# Patient Record
Sex: MALE | Race: BLACK OR AFRICAN AMERICAN | NOT HISPANIC OR LATINO | Employment: OTHER
[De-identification: names, ages, dates, MRNs, and addresses within clinical notes are randomized per-mention and may not be internally consistent; named-entity substitution may affect disease eponyms.]

---
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
## 2021-11-20 ENCOUNTER — HOSPITAL ENCOUNTER (EMERGENCY)
Facility: CLINIC | Age: 21
Discharge: HOME | End: 2021-11-20
Attending: EMERGENCY MEDICINE | Admitting: EMERGENCY MEDICINE
Payer: COMMERCIAL

## 2021-11-20 VITALS
OXYGEN SATURATION: 100 % | RESPIRATION RATE: 16 BRPM | WEIGHT: 116 LBS | SYSTOLIC BLOOD PRESSURE: 122 MMHG | TEMPERATURE: 98 F | HEART RATE: 70 BPM | BODY MASS INDEX: 17.13 KG/M2 | DIASTOLIC BLOOD PRESSURE: 83 MMHG

## 2021-11-20 DIAGNOSIS — R59.0 CERVICAL LYMPHADENOPATHY: ICD-10-CM

## 2021-11-20 DIAGNOSIS — J02.9 PHARYNGITIS WITH VIRAL SYNDROME: ICD-10-CM

## 2021-11-20 DIAGNOSIS — B34.9 PHARYNGITIS WITH VIRAL SYNDROME: ICD-10-CM

## 2021-11-20 LAB
DEPRECATED S PYO AG THROAT QL EIA: NEGATIVE
FLUAV RNA SPEC QL NAA+PROBE: NEGATIVE
FLUBV RNA RESP QL NAA+PROBE: NEGATIVE
GROUP A STREP BY PCR: NOT DETECTED
SARS-COV-2 RNA RESP QL NAA+PROBE: NEGATIVE

## 2021-11-20 PROCEDURE — 87651 STREP A DNA AMP PROBE: CPT | Performed by: EMERGENCY MEDICINE

## 2021-11-20 PROCEDURE — 99283 EMERGENCY DEPT VISIT LOW MDM: CPT | Performed by: EMERGENCY MEDICINE

## 2021-11-20 PROCEDURE — 87636 SARSCOV2 & INF A&B AMP PRB: CPT | Performed by: EMERGENCY MEDICINE

## 2021-11-20 PROCEDURE — C9803 HOPD COVID-19 SPEC COLLECT: HCPCS | Performed by: EMERGENCY MEDICINE

## 2021-11-20 RX ORDER — BENZOCAINE AND MENTHOL, UNSPECIFIED FORM 15; 2.3 MG/1; MG/1
1 LOZENGE ORAL
Qty: 16 LOZENGE | Refills: 0 | Status: SHIPPED | OUTPATIENT
Start: 2021-11-20 | End: 2022-06-28

## 2021-11-20 NOTE — ED PROVIDER NOTES
"    [facility] EMERGENCY DEPARTMENT ([facility])       11/20/21  History     Chief Complaint   Patient presents with     Neck Pain     Reports lumps in his neck on the right side      Otalgia     Right ear     HPI  Sea Ash is a 21 year old male with a past medical history significant for R wrist fracture who presents to the Emergency Department for evaluation of \"lumps\" on the right side of his neck. A/w R ear pain and sore throat.  He reports that he has had some difficulty eating 2/2 this, however his sore throat has improved today compared to yesterday..  He denies any fever or cough.  No known sick contacts.  He is not vaccinated against COVID-19 infection.  He is concerned that the lumps on his neck are cancer.    Past Medical History  History reviewed. No pertinent past medical history.  History reviewed. No pertinent surgical history.  NO ACTIVE MEDICATIONS  traMADol (ULTRAM) 50 MG tablet      No Known Allergies  Family History  History reviewed. No pertinent family history.  Social History   Social History     Tobacco Use     Smoking status: Never Smoker     Smokeless tobacco: Never Used   Substance Use Topics     Alcohol use: No     Drug use: No      Past medical history, past surgical history, medications, allergies, family history, and social history were reviewed with the patient. No additional pertinent items.     I have reviewed the Medications, Allergies, Past Medical and Surgical History, and Social History in the Epic system.    Review of Systems   General: No fevers or chills  Skin: No rash or diaphoresis  Eyes: No eye redness or discharge  Ears/Nose/Throat: See HPI  Respiratory: No cough or SOB  Cardiovascular: No chest pain or palpitations  Gastrointestinal: No nausea, vomiting, or diarrhea  Genitourinary: No urinary frequency, hematuria, or dysuria  Musculoskeletal: No arthralgias or myalgias  Neurologic: No numbness or weakness  Psychiatric: No depression or " SI  Hematologic/Lymphatic/Immunologic: No leg swelling, no easy bruising/bleeding  Endocrine: No polyuria/polydypsia    A complete review of systems was performed with pertinent positives and negatives noted in the HPI, and all other systems negative.    Physical Exam   BP: 122/83  Pulse: 70  Temp: 98  F (36.7  C)  Resp: 16  Weight: 52.6 kg (116 lb)  SpO2: 100 %      Physical Exam  General: Well nourished, well developed, NAD  HEENT: EOMI, anicteric. NCAT, MMM, mild posterior oropharyngeal erythema without tonsillar swelling or exudate.  Left tympanic membrane is normal, right external ear canal partially obscured with cerumen, however, visualized portion of right tympanic membrane is normal  Neck: no jugular venous distension, supple, nl ROM, shotty anterior cervical lymphadenopathy  Cardiac: Regular rate and rhythm. No murmurs, rubs, or gallops. Normal S1, S2.  Intact peripheral pulses  Pulm: CTAB, no stridor, wheezes, rales, rhonchi   Skin: Warm and dry to the touch.  No rash  Extremities: No LE edema, no cyanosis, w/w/p  Neuro: A&Ox3, no gross focal deficits        ED Course        Procedures            The medical record was reviewed and interpreted.  Current labs reviewed and interpreted.         Results for orders placed or performed during the hospital encounter of 11/20/21 (from the past 24 hour(s))   Streptococcus A Rapid Scr w Reflx to PCR    Specimen: Throat; Swab   Result Value Ref Range    Group A Strep antigen Negative Negative   Symptomatic Influenza A/B & SARS-CoV2 (COVID-19) Virus PCR Multiplex Nasopharyngeal    Specimen: Nasopharyngeal; Swab   Result Value Ref Range    Influenza A target Negative Negative    Influenza B target Negative Negative    SARS CoV2 PCR Negative Negative    Narrative    Testing was performed using the marilyn SARS-CoV-2 & Influenza A/B Assay on the marilyn [employer] System. This test should be ordered for the detection of SARS-CoV-2 and influenza viruses in individuals who meet  clinical and/or epidemiological criteria. Test performance is unknown in asymptomatic patients. This test is for in vitro diagnostic use under the FDA EUA for laboratories certified under CLIA to perform moderate and/or high complexity testing. This test has not been FDA cleared or approved. A negative result does not rule out the presence of PCR inhibitors in the specimen or target RNA in concentration below the limit of detection for the assay. If only one viral target is positive but coinfection with multiple targets is suspected, the sample should be re-tested with another FDA cleared, approved or authorized test, if coinfection would change clinical management. [facility] are certified under the Clinical Laboratory Improvement Amendments of 1988 (CLIA-88) as  qualified to perform moderate and/or high complexity laboratory testing.   Group A Streptococcus PCR Throat Swab    Specimen: Throat; Swab   Result Value Ref Range    Group A strep by PCR Not Detected Not Detected    Narrative    The Xpert Xpress Strep A test, performed on the [employer], is a rapid, qualitative in vitro diagnostic test for the detection of Streptococcus pyogenes (Group A ß-hemolytic Streptococcus, Strep A) in throat swab specimens from patients with signs and symptoms of pharyngitis. The Xpert Xpress Strep A test can be used as an aid in the diagnosis of Group A Streptococcal pharyngitis. The assay is not intended to monitor treatment for Group A Streptococcus infections. The Xpert Xpress Strep A test utilizes an automated real-time polymerase chain reaction (PCR) to detect Streptococcus pyogenes DNA.     Medications - No data to display          Assessments & Plan (with Medical Decision Making)   The pt is a 21 yom who presents with sore throat, right ear pain with associated cervical LAD.  Exam consistent with pharyngitis with associated cervical lymphadenopathy.  No significantly enlarged solitary  lymph nodes or other findings to suggest malignant process.  Patient is requesting a CT at this time, advised patient that given that the most likely diagnosis was viral syndrome with associated lymphadenopathy, the risks of CT (i.e. radiation and contrast administration) would exceed the benefits at this time.  However, patient is advised to follow-up with his PCP.  Further work-up of lymphadenopathy may be indicated if it does not resolve within several weeks after his upper respiratory infection symptoms have resolved.    Strep, Covid, influenza, and RSV testing were sent and were negative    I have reviewed the nursing notes.    I have reviewed the findings, diagnosis, plan and need for follow up with the patient.    Patient to be discharged home. Advised to follow up with PCP To return to ER immediately with any new/worsening symptoms. Plan of care discussed with patient who expresses understanding and agrees with plan of care.  Patient is advised to use Tylenol/ibuprofen for pain control at home, additionally he is given a prescription for Cepacol      Discharge Medication List as of 11/20/2021  6:21 PM      START taking these medications    Details   Benzocaine-Menthol (CEPACOL) 15-2.3 MG LOZG Take 1 lozenge by mouth every 2 hours as needed (sore throat), Disp-16 lozenge, R-0, Local Print             Final diagnoses:   Cervical lymphadenopathy   Pharyngitis with viral syndrome       I, Deja Andrade am serving as a trained medical scribe to document services personally performed by Vince Hartmann MD, based on the provider's statements to me.      I, Rahel Quinonez MD, was physically present and have reviewed and verified the accuracy of this note documented by Deja Andrade.     Rahel Quinonez MD  11/20/2021   [facility] EMERGENCY DEPARTMENT     Rahel Quinonez MD  11/21/21 6200

## 2021-11-21 ENCOUNTER — NURSE TRIAGE (OUTPATIENT)
Dept: NURSING | Facility: CLINIC | Age: 21
End: 2021-11-21
Payer: COMMERCIAL

## 2021-11-21 NOTE — TELEPHONE ENCOUNTER

## 2021-11-21 NOTE — RESULT ENCOUNTER NOTE
Negative for Influenza A, Influenza B, and Covid19.  Patient will receive the Covid19 result via [employer] and a letter will be sent via "[employer] (if active) or via the mail

## 2021-11-28 ENCOUNTER — HOSPITAL ENCOUNTER (EMERGENCY)
Facility: CLINIC | Age: 21
Discharge: HOME | End: 2021-11-28
Attending: EMERGENCY MEDICINE | Admitting: EMERGENCY MEDICINE
Payer: COMMERCIAL

## 2021-11-28 VITALS
SYSTOLIC BLOOD PRESSURE: 128 MMHG | WEIGHT: 109 LBS | BODY MASS INDEX: 16.1 KG/M2 | TEMPERATURE: 98.3 F | OXYGEN SATURATION: 99 % | RESPIRATION RATE: 16 BRPM | DIASTOLIC BLOOD PRESSURE: 83 MMHG | HEART RATE: 77 BPM

## 2021-11-28 DIAGNOSIS — K60.2 ANAL FISSURE: ICD-10-CM

## 2021-11-28 DIAGNOSIS — K62.89 RECTAL PAIN: ICD-10-CM

## 2021-11-28 PROCEDURE — 99282 EMERGENCY DEPT VISIT SF MDM: CPT | Performed by: EMERGENCY MEDICINE

## 2021-11-28 PROCEDURE — 99283 EMERGENCY DEPT VISIT LOW MDM: CPT | Performed by: EMERGENCY MEDICINE

## 2021-11-28 RX ORDER — HYDROXYZINE HYDROCHLORIDE 25 MG/1
TABLET, FILM COATED ORAL
COMMUNITY
Start: 2021-11-17 | End: 2022-12-06

## 2021-11-28 ASSESSMENT — ENCOUNTER SYMPTOMS
BLOOD IN STOOL: 0
BRUISES/BLEEDS EASILY: 0
HEADACHES: 0
COLOR CHANGE: 0
CONFUSION: 0
RECTAL PAIN: 1
SHORTNESS OF BREATH: 0
FEVER: 0
ABDOMINAL PAIN: 1
ARTHRALGIAS: 0
NECK STIFFNESS: 0
DIFFICULTY URINATING: 0
COUGH: 0
DYSURIA: 1
EYE REDNESS: 0
DIARRHEA: 1

## 2021-11-28 NOTE — ED PROVIDER NOTES
ED Provider Note  [facility]      History     Chief Complaint   Patient presents with     Dysuria     Painful urination x 1 year     Diarrhea     Watery, yellow diarrhea x 1 month. Pt also endorses rectal pain.     The history is provided by the patient.     Sea Ash is a 21 year old otherwise healthy male who presents to the ED for a chief complaint of rectal pain.  Patient reports starting 2 days ago when he passes stool he has been experiencing some rectal and anus pain.  He has been passing watery, yellowish diarrhea for the past month.  He notes sometimes he has to strain when passing stool.  Patient denies blood in stool.  He endorses mild lower abdominal pain that is described as an intermittent cramping sensation. Patient reports dysuria for the last year.  He states that it is more painful at the end of urinating and has trouble emptying his bladder. Patent has been seen by his primary care provider and has been referred to urology for further evaluation.  Patient reports that he had a cold last week but those symptoms have resolved.  Denies fever.  Patient denies recent sexual activity.  No foreign objects in rectal area.  No penile drainage or discharge.  Patient has not taken any medication for pain.  He reports that he does chew tobacco sometimes, but denies any drug use.     Patient had recent labs on 11/17/2021 that showed microscopic blood in his urine but otherwise normal results.  Patient has been referred to urology.    Past Medical History  History reviewed. No pertinent past medical history.  History reviewed. No pertinent surgical history.  Benzocaine-Menthol (CEPACOL) 15-2.3 MG LOZG  hydrOXYzine (ATARAX) 25 MG tablet  NO ACTIVE MEDICATIONS  traMADol (ULTRAM) 50 MG tablet      No Known Allergies  Family History  History reviewed. No pertinent family history.  Social History   Social History     Tobacco Use     Smoking status: Never Smoker     Smokeless tobacco:  Never Used   Substance Use Topics     Alcohol use: No     Drug use: No      Past medical history, past surgical history, medications, allergies, family history, and social history were reviewed with the patient. No additional pertinent items.       Review of Systems   Constitutional: Negative for fever.   HENT: Negative for congestion.    Eyes: Negative for redness.   Respiratory: Negative for cough and shortness of breath.    Cardiovascular: Negative for chest pain.   Gastrointestinal: Positive for abdominal pain, diarrhea and rectal pain. Negative for blood in stool.   Endocrine: Negative for polyuria.   Genitourinary: Positive for dysuria. Negative for difficulty urinating and penile discharge.   Musculoskeletal: Negative for arthralgias and neck stiffness.   Skin: Negative for color change.   Allergic/Immunologic: Negative for immunocompromised state.   Neurological: Negative for headaches.   Hematological: Does not bruise/bleed easily.   Psychiatric/Behavioral: Negative for confusion.     A complete review of systems was performed with pertinent positives and negatives noted in the HPI, and all other systems negative.    Physical Exam   BP: 128/83  Pulse: 77  Temp: 98.3  F (36.8  C)  Resp: 16  Weight: 49.4 kg (109 lb)  SpO2: 99 %  Physical Exam  General: Afebrile, no acute distress   HEENT: Normocephalic, atraumatic, conjunctivae normal. MMM  Neck: non-tender, supple  Cardio: regular rate. regular rhythm   Resp: Normal work of breathing, no respiratory distress, lungs clear bilaterally, no wheezing, rhonchi, rales  Chest/Back: no visual signs of trauma, no CVA tenderness   Abdomen: soft, non distension, no tenderness, no peritoneal signs   Rectal exam: small anal fissure with no active bleeding, no hemorrhoids, internal exam with no gross blood, hemoccult negative  Neuro: alert and fully oriented. CN II-XII grossly intact. Grossly normal strength and sensation in all extremities.   MSK: no deformities. Normal  range of motion  Integumentary/Skin: no rash visualized, normal color  Psych: normal affect, normal behavior    ED Course     1:56 AM  The patient was seen and examined by Hanna Rea MD in Room ED05.  Procedures  No results found for any visits on 11/28/21.  Medications - No data to display     Assessments & Plan (with Medical Decision Making)     Sea Ash is a 21 year old otherwise healthy male who presents to the ED for a chief complaint of rectal pain.  Upon arrival patient is well-appearing, afebrile, no distress.  Patient here with rectal pain x 2 days that is worse with bowel movement and touching his rectal area. No trauma or injury. Patient reports loose watery stools for the past 1 month.  On examination patient with small anal fissure, no evidence of hemorrhoids, no rectal bleeding, Hemoccult negative, no evidence of trauma.  I suspect patient symptoms are likely related to anal fissure related to multiple watery stools.  At this time recommends continued supportive care, encourage high-fiber diet, sitz bath, pain control with Tylenol and ibuprofen.  And close outpatient follow-up with his primary care provider.  Patient is agreeable to this plan.  Patient also has dysuria that has been ongoing for the past 1 year, has recently been seen by his primary care provider last week, urinalysis with microscopic blood, no evidence of acute infection, and is pending follow-up with urology, referral has been placed.  We will continue outpatient management and urology referral is overall patient is nontoxic-appearing with no acute or worsening symptoms.  Return precautions discussed if high fever, severe pain, persistent vomiting, or any  worsening symptoms.  Patient understands and agrees with plan.    I have reviewed the nursing notes. I have reviewed the findings, diagnosis, plan and need for follow up with the patient.    New Prescriptions    No medications on file       Final diagnoses:   Rectal pain    Anal fissure       --  I, Amirah Kirby, am serving as a trained medical scribe to document services personally performed by Hanna Rea MD, based on the provider's statements to me.     I, Hanna Rea MD, was physically present and have reviewed and verified the accuracy of this note documented by Amirah Kirby.    Hanna Rea MD  [facility] EMERGENCY DEPARTMENT  11/28/2021     Hanna Rea MD  11/28/21 0234

## 2021-11-28 NOTE — DISCHARGE INSTRUCTIONS
Thank you for your patience today.  Please follow-up with your regular doctor in the next 2-3 days for further evaluation and follow-up care.  Please call to schedule an appointment. Please follow up with urology as previously directed.  Please continue your own medications.  Please eat a diet high in fiber, take sitz baths daily. You may take tylenol or ibuprofen as needed for pain.  Please return to the ER if you develop high fever, severe pain, persistent vomiting, or any worsening of your current symptoms.  It was a pleasure taking care of you today.  We hope you feel better soon.

## 2022-03-26 ENCOUNTER — APPOINTMENT (OUTPATIENT)
Dept: GENERAL RADIOLOGY | Facility: CLINIC | Age: 22
End: 2022-03-26
Attending: EMERGENCY MEDICINE
Payer: COMMERCIAL

## 2022-03-26 ENCOUNTER — HOSPITAL ENCOUNTER (EMERGENCY)
Facility: CLINIC | Age: 22
Discharge: HOME | End: 2022-03-26
Attending: EMERGENCY MEDICINE | Admitting: EMERGENCY MEDICINE
Payer: COMMERCIAL

## 2022-03-26 VITALS
SYSTOLIC BLOOD PRESSURE: 116 MMHG | HEART RATE: 98 BPM | RESPIRATION RATE: 16 BRPM | BODY MASS INDEX: 16.7 KG/M2 | TEMPERATURE: 97.9 F | OXYGEN SATURATION: 99 % | WEIGHT: 113.1 LBS | DIASTOLIC BLOOD PRESSURE: 71 MMHG

## 2022-03-26 DIAGNOSIS — R10.13 ABDOMINAL PAIN, EPIGASTRIC: ICD-10-CM

## 2022-03-26 LAB
ALBUMIN SERPL-MCNC: 3.5 G/DL (ref 3.4–5)
ALP SERPL-CCNC: 79 U/L (ref 40–150)
ALT SERPL W P-5'-P-CCNC: 17 U/L (ref 0–70)
ANION GAP SERPL CALCULATED.3IONS-SCNC: 8 MMOL/L (ref 3–14)
AST SERPL W P-5'-P-CCNC: 11 U/L (ref 0–45)
BASOPHILS # BLD AUTO: 0 10E3/UL (ref 0–0.2)
BASOPHILS NFR BLD AUTO: 1 %
BILIRUB SERPL-MCNC: 0.6 MG/DL (ref 0.2–1.3)
BUN SERPL-MCNC: 12 MG/DL (ref 7–30)
CALCIUM SERPL-MCNC: 9.3 MG/DL (ref 8.5–10.1)
CHLORIDE BLD-SCNC: 105 MMOL/L (ref 94–109)
CO2 SERPL-SCNC: 26 MMOL/L (ref 20–32)
CREAT SERPL-MCNC: 0.91 MG/DL (ref 0.66–1.25)
EOSINOPHIL # BLD AUTO: 0.1 10E3/UL (ref 0–0.7)
EOSINOPHIL NFR BLD AUTO: 2 %
ERYTHROCYTE [DISTWIDTH] IN BLOOD BY AUTOMATED COUNT: 12.7 % (ref 10–15)
GFR SERPL CREATININE-BSD FRML MDRD: >90 ML/MIN/1.73M2
GLUCOSE BLD-MCNC: 115 MG/DL (ref 70–99)
HCT VFR BLD AUTO: 44.6 % (ref 40–53)
HGB BLD-MCNC: 15 G/DL (ref 13.3–17.7)
IMM GRANULOCYTES # BLD: 0 10E3/UL
IMM GRANULOCYTES NFR BLD: 0 %
LIPASE SERPL-CCNC: 96 U/L (ref 73–393)
LYMPHOCYTES # BLD AUTO: 2.3 10E3/UL (ref 0.8–5.3)
LYMPHOCYTES NFR BLD AUTO: 28 %
MCH RBC QN AUTO: 28.1 PG (ref 26.5–33)
MCHC RBC AUTO-ENTMCNC: 33.6 G/DL (ref 31.5–36.5)
MCV RBC AUTO: 84 FL (ref 78–100)
MONOCYTES # BLD AUTO: 0.4 10E3/UL (ref 0–1.3)
MONOCYTES NFR BLD AUTO: 6 %
NEUTROPHILS # BLD AUTO: 5.2 10E3/UL (ref 1.6–8.3)
NEUTROPHILS NFR BLD AUTO: 63 %
NRBC # BLD AUTO: 0 10E3/UL
NRBC BLD AUTO-RTO: 0 /100
PLATELET # BLD AUTO: 312 10E3/UL (ref 150–450)
POTASSIUM BLD-SCNC: 3.7 MMOL/L (ref 3.4–5.3)
PROT SERPL-MCNC: 7.2 G/DL (ref 6.8–8.8)
RBC # BLD AUTO: 5.33 10E6/UL (ref 4.4–5.9)
SODIUM SERPL-SCNC: 139 MMOL/L (ref 133–144)
TROPONIN I SERPL HS-MCNC: 3 NG/L
WBC # BLD AUTO: 8 10E3/UL (ref 4–11)

## 2022-03-26 PROCEDURE — 74019 RADEX ABDOMEN 2 VIEWS: CPT

## 2022-03-26 PROCEDURE — 84484 ASSAY OF TROPONIN QUANT: CPT | Performed by: EMERGENCY MEDICINE

## 2022-03-26 PROCEDURE — 250N000013 HC RX MED GY IP 250 OP 250 PS 637: Performed by: EMERGENCY MEDICINE

## 2022-03-26 PROCEDURE — 36415 COLL VENOUS BLD VENIPUNCTURE: CPT | Performed by: EMERGENCY MEDICINE

## 2022-03-26 PROCEDURE — 99285 EMERGENCY DEPT VISIT HI MDM: CPT | Mod: 25 | Performed by: EMERGENCY MEDICINE

## 2022-03-26 PROCEDURE — 250N000009 HC RX 250: Performed by: EMERGENCY MEDICINE

## 2022-03-26 PROCEDURE — 85025 COMPLETE CBC W/AUTO DIFF WBC: CPT | Performed by: EMERGENCY MEDICINE

## 2022-03-26 PROCEDURE — 80053 COMPREHEN METABOLIC PANEL: CPT | Performed by: EMERGENCY MEDICINE

## 2022-03-26 PROCEDURE — 83690 ASSAY OF LIPASE: CPT | Performed by: EMERGENCY MEDICINE

## 2022-03-26 PROCEDURE — 82040 ASSAY OF SERUM ALBUMIN: CPT | Performed by: EMERGENCY MEDICINE

## 2022-03-26 PROCEDURE — 93010 ELECTROCARDIOGRAM REPORT: CPT | Performed by: EMERGENCY MEDICINE

## 2022-03-26 PROCEDURE — 93005 ELECTROCARDIOGRAM TRACING: CPT | Performed by: EMERGENCY MEDICINE

## 2022-03-26 RX ORDER — FAMOTIDINE 10 MG
10 TABLET ORAL 2 TIMES DAILY
Qty: 28 TABLET | Refills: 0 | Status: SHIPPED | OUTPATIENT
Start: 2022-03-26 | End: 2022-04-09

## 2022-03-26 RX ADMIN — LIDOCAINE HYDROCHLORIDE 30 ML: 20 SOLUTION ORAL; TOPICAL at 20:38

## 2022-03-26 ASSESSMENT — ENCOUNTER SYMPTOMS
DIARRHEA: 0
SHORTNESS OF BREATH: 0
TREMORS: 1
HEMATURIA: 0
CONSTIPATION: 0
DYSURIA: 0
CHILLS: 0
FEVER: 0
BLOOD IN STOOL: 0
ABDOMINAL PAIN: 1
VOMITING: 0

## 2022-03-27 LAB
ATRIAL RATE - MUSE: 72 BPM
DIASTOLIC BLOOD PRESSURE - MUSE: NORMAL MMHG
INTERPRETATION ECG - MUSE: NORMAL
P AXIS - MUSE: 79 DEGREES
PR INTERVAL - MUSE: 136 MS
QRS DURATION - MUSE: 86 MS
QT - MUSE: 352 MS
QTC - MUSE: 385 MS
R AXIS - MUSE: 85 DEGREES
SYSTOLIC BLOOD PRESSURE - MUSE: NORMAL MMHG
T AXIS - MUSE: 72 DEGREES
VENTRICULAR RATE- MUSE: 72 BPM

## 2022-03-27 NOTE — ED PROVIDER NOTES
ED Provider Note  [facility]      History   No chief complaint on file.    HPI  Sea Ash is an otherwise-healthy 21 year old male who presents to the ED today complaining of abdominal and left arm pain.  Patient is complaining of constant epigastric abdominal pain which started at around 1 PM this afternoon.  He reports his abdominal pain is worse with deep inspiration.  He states that his pain sometimes radiates to his neck and back.  He denies experiencing similar pain in the past. He also notes that his left arm has been twitching at night for the past 3 days, adding that this arm started hurting today.  Patient denies vomiting, diarrhea, constipation, bloody stool, prior abdominal surgeries, fever, chills, dysuria, hematuria, alcohol use, tobacco use or any shortness of breath.    Past Medical History  History reviewed. No pertinent past medical history.  History reviewed. No pertinent surgical history.  Benzocaine-Menthol (CEPACOL) 15-2.3 MG LOZG  hydrOXYzine (ATARAX) 25 MG tablet  NO ACTIVE MEDICATIONS  traMADol (ULTRAM) 50 MG tablet      No Known Allergies  Family History  No family history on file.  Social History   Social History     Tobacco Use     Smoking status: Never Smoker     Smokeless tobacco: Never Used   Substance Use Topics     Alcohol use: No     Drug use: No      Past medical history, past surgical history, medications, allergies, family history, and social history were reviewed with the patient. No additional pertinent items.       Review of Systems   Constitutional: Negative for chills and fever.   Respiratory: Negative for shortness of breath.    Gastrointestinal: Positive for abdominal pain (epigastric, constant). Negative for blood in stool, constipation, diarrhea and vomiting.   Genitourinary: Negative for dysuria and hematuria.   Musculoskeletal:        (Positive for left arm pain)   Neurological: Positive for tremors (Left arm).   All other systems  reviewed and are negative.    A complete review of systems was performed with pertinent positives and negatives noted in the HPI, and all other systems negative.    Physical Exam      Physical Exam  Vitals and nursing note reviewed.   Constitutional:       General: He is not in acute distress.     Appearance: He is not diaphoretic.   HENT:      Head: Normocephalic and atraumatic.      Right Ear: Tympanic membrane normal.      Left Ear: Tympanic membrane normal.   Cardiovascular:      Rate and Rhythm: Normal rate and regular rhythm.   Pulmonary:      Effort: Pulmonary effort is normal. No respiratory distress.      Breath sounds: Normal breath sounds. No wheezing or rales.   Abdominal:      General: There is no distension.      Palpations: Abdomen is soft.      Tenderness: There is no abdominal tenderness. There is no right CVA tenderness, left CVA tenderness or guarding.   Musculoskeletal:         General: Normal range of motion.      Cervical back: Normal range of motion and neck supple.   Skin:     General: Skin is warm and dry.      Coloration: Skin is not cyanotic, jaundiced or pale.      Findings: No rash.   Neurological:      General: No focal deficit present.      Mental Status: He is alert and oriented to person, place, and time.      Sensory: No sensory deficit.   Psychiatric:         Mood and Affect: Mood normal.         Behavior: Behavior normal.         ED Course     8:21 PM  The patient was seen and examined by Aaron Sinclair MD in Room ED06.     Procedures       The medical record was reviewed and interpreted.  Current labs reviewed and interpreted.  Current images reviewed and interpreted: Abd xray normal.  EKG reviewed and interpreted: NSR.            EKG Interpretation:      Interpreted by Aaron Sinclair MD  Time reviewed:810p   Symptoms at time of EKG: cp   Rhythm: normal sinus   Rate: normal  Axis: NORMAL  Ectopy: none  Conduction: normal  ST Segments/ T Waves: No ST-T wave  changes  Q Waves: none  Comparison to prior: Unchanged    Clinical Impression: normal EKG       No results found for any visits on 03/26/22.  Medications - No data to display     Assessments & Plan (with Medical Decision Making)   Sea presents with epigastric abdominal pain over the past 24 hours.  Differential includes GB disease, gastritis, enteritis, perforated viscus.  An abdominal xray showed a normal bowel gas pattern with no evidence of obstruction or free air. His labs, CBC, CMP and lipase were also normal. His EKG showed no abnormalities and his troponin was in the reference range.  He felt improved after a GI cocktail.  I suspect a benign GI etiology.  He was discharged with instructions to eat a bland diet and take Pepcid for the next 2 weeks and follow up with his primary care provider or return to the ER if worsening symptoms.     I have reviewed the nursing notes. I have reviewed the findings, diagnosis, plan and need for follow up with the patient.    New Prescriptions    No medications on file       Final diagnoses:   None   ISteven, am serving as a trained medical scribe to document services personally performed by Aaron Sinclair MD, based on the provider's statements to me.     I, Aaron Sinclair MD, was physically present and have reviewed and verified the accuracy of this note documented by Steven Paulino.      --  Aaron Sinclair MD  [facility] EMERGENCY DEPARTMENT  3/26/2022     Aaron Sinclair MD  03/27/22 1255       Aaron Sinclair MD  03/30/22 [id number]

## 2022-03-27 NOTE — DISCHARGE INSTRUCTIONS
Your testing today was normal.  Please take Pepcid for symptoms of heartburn.  Please avoid foods that may make heartburn worse, please see the attached document.  Return if you have any worsening symptoms.

## 2022-06-27 ENCOUNTER — HOSPITAL ENCOUNTER (EMERGENCY)
Facility: CLINIC | Age: 22
Discharge: HOME | End: 2022-06-27
Attending: EMERGENCY MEDICINE | Admitting: EMERGENCY MEDICINE
Payer: COMMERCIAL

## 2022-06-27 VITALS
HEART RATE: 62 BPM | DIASTOLIC BLOOD PRESSURE: 69 MMHG | BODY MASS INDEX: 13.45 KG/M2 | SYSTOLIC BLOOD PRESSURE: 110 MMHG | OXYGEN SATURATION: 100 % | HEIGHT: 78 IN | TEMPERATURE: 98.2 F | RESPIRATION RATE: 16 BRPM | WEIGHT: 116.2 LBS

## 2022-06-27 DIAGNOSIS — N64.52 NIPPLE DISCHARGE: ICD-10-CM

## 2022-06-27 LAB
ALBUMIN SERPL-MCNC: 3.9 G/DL (ref 3.4–5)
ALP SERPL-CCNC: 74 U/L (ref 40–150)
ALT SERPL W P-5'-P-CCNC: 17 U/L (ref 0–70)
ANION GAP SERPL CALCULATED.3IONS-SCNC: 7 MMOL/L (ref 3–14)
AST SERPL W P-5'-P-CCNC: 12 U/L (ref 0–45)
BASOPHILS # BLD AUTO: 0 10E3/UL (ref 0–0.2)
BASOPHILS NFR BLD AUTO: 1 %
BILIRUB SERPL-MCNC: 0.8 MG/DL (ref 0.2–1.3)
BUN SERPL-MCNC: 11 MG/DL (ref 7–30)
CALCIUM SERPL-MCNC: 8.7 MG/DL (ref 8.5–10.1)
CHLORIDE BLD-SCNC: 109 MMOL/L (ref 94–109)
CO2 SERPL-SCNC: 26 MMOL/L (ref 20–32)
CREAT SERPL-MCNC: 0.93 MG/DL (ref 0.66–1.25)
EOSINOPHIL # BLD AUTO: 0 10E3/UL (ref 0–0.7)
EOSINOPHIL NFR BLD AUTO: 0 %
ERYTHROCYTE [DISTWIDTH] IN BLOOD BY AUTOMATED COUNT: 13.4 % (ref 10–15)
GFR SERPL CREATININE-BSD FRML MDRD: >90 ML/MIN/1.73M2
GLUCOSE BLD-MCNC: 91 MG/DL (ref 70–99)
HCT VFR BLD AUTO: 41 % (ref 40–53)
HGB BLD-MCNC: 13.7 G/DL (ref 13.3–17.7)
IMM GRANULOCYTES # BLD: 0 10E3/UL
IMM GRANULOCYTES NFR BLD: 0 %
LYMPHOCYTES # BLD AUTO: 2.3 10E3/UL (ref 0.8–5.3)
LYMPHOCYTES NFR BLD AUTO: 46 %
MCH RBC QN AUTO: 28.7 PG (ref 26.5–33)
MCHC RBC AUTO-ENTMCNC: 33.4 G/DL (ref 31.5–36.5)
MCV RBC AUTO: 86 FL (ref 78–100)
MONOCYTES # BLD AUTO: 0.3 10E3/UL (ref 0–1.3)
MONOCYTES NFR BLD AUTO: 7 %
NEUTROPHILS # BLD AUTO: 2.3 10E3/UL (ref 1.6–8.3)
NEUTROPHILS NFR BLD AUTO: 46 %
NRBC # BLD AUTO: 0 10E3/UL
NRBC BLD AUTO-RTO: 0 /100
PLATELET # BLD AUTO: 267 10E3/UL (ref 150–450)
POTASSIUM BLD-SCNC: 4 MMOL/L (ref 3.4–5.3)
PROLACTIN SERPL 3RD IS-MCNC: 7 NG/ML (ref 4–15)
PROT SERPL-MCNC: 6.9 G/DL (ref 6.8–8.8)
RBC # BLD AUTO: 4.77 10E6/UL (ref 4.4–5.9)
SODIUM SERPL-SCNC: 142 MMOL/L (ref 133–144)
TSH SERPL DL<=0.005 MIU/L-ACNC: 2.27 MU/L (ref 0.4–4)
WBC # BLD AUTO: 5 10E3/UL (ref 4–11)

## 2022-06-27 PROCEDURE — 84146 ASSAY OF PROLACTIN: CPT | Performed by: EMERGENCY MEDICINE

## 2022-06-27 PROCEDURE — 84443 ASSAY THYROID STIM HORMONE: CPT | Performed by: EMERGENCY MEDICINE

## 2022-06-27 PROCEDURE — 85025 COMPLETE CBC W/AUTO DIFF WBC: CPT | Performed by: EMERGENCY MEDICINE

## 2022-06-27 PROCEDURE — 99282 EMERGENCY DEPT VISIT SF MDM: CPT | Performed by: EMERGENCY MEDICINE

## 2022-06-27 PROCEDURE — 80053 COMPREHEN METABOLIC PANEL: CPT | Performed by: EMERGENCY MEDICINE

## 2022-06-27 PROCEDURE — 36415 COLL VENOUS BLD VENIPUNCTURE: CPT | Performed by: EMERGENCY MEDICINE

## 2022-06-27 NOTE — ED TRIAGE NOTES
Pt having discharge from from bilateral nipples.  Pt stated R nipple had bloody discharge when squeezed.  Pt stated bilateral tenderness started last week.  Pt denies anything extra strenuous with chest muscles or any trauma/injury to chest.     Triage Assessment     Row Name 06/27/22 0536       Triage Assessment (Adult)    Airway WDL WDL       Respiratory WDL    Respiratory WDL WDL       Skin Circulation/Temperature WDL    Skin Circulation/Temperature WDL WDL       Cardiac WDL    Cardiac WDL WDL       Peripheral/Neurovascular WDL    Peripheral Neurovascular WDL WDL       Cognitive/Neuro/Behavioral WDL    Cognitive/Neuro/Behavioral WDL WDL

## 2022-06-27 NOTE — ED PROVIDER NOTES
ED Provider Note  [facility]      History     Chief Complaint   Patient presents with     Breast Problem     Pt having discharge from from bilateral nipples.  Pt stated R nipple had bloody discharge when squeezed.  Pt stated bilateral tenderness started last week.  Pt denies anything extra strenuous with chest muscles or any trauma/injury to chest.     HPI  Sea Ash is a 21 year old male who has had a week of nipple sensitivity now with some discharge, clear out of the left and some bloody on the right.  Denies masses, fevers, chills.  He is not have other chest pain or shortness of breath.  No history of heart issues.  He does not take any drugs or supplements.  No change in vision, headaches, nausea, vomiting, vertigo.  Denies numbness, tingling or weakness.  He is never had a thing like this in the past.  No increase in running or activity.  Denies any skin changes.  Not sure about family history of cancer.  He has had the sensitivity for the past week.    Patient declined formal     Past Medical History  History reviewed. No pertinent past medical history.  History reviewed. No pertinent surgical history.  Benzocaine-Menthol (CEPACOL) 15-2.3 MG LOZG  hydrOXYzine (ATARAX) 25 MG tablet  traMADol (ULTRAM) 50 MG tablet  NO ACTIVE MEDICATIONS      No Known Allergies  Family History  History reviewed. No pertinent family history.  Social History   Social History     Tobacco Use     Smoking status: Never Smoker     Smokeless tobacco: Never Used   Substance Use Topics     Alcohol use: No     Drug use: No      Past medical history, past surgical history, medications, allergies, family history, and social history were reviewed with the patient. No additional pertinent items.       Review of Systems  A complete review of systems was performed with pertinent positives and negatives noted in the HPI, and all other systems negative.    Physical Exam   BP: 117/75  Pulse: 59  Temp:  "98.1  F (36.7  C)  Resp: 14  Height: 203.2 cm (6' 8\")  Weight: 52.7 kg (116 lb 3.2 oz)  SpO2: 100 %  Physical Exam  Physical Exam   Constitutional: oriented to person, place, and time. appears well-developed and well-nourished.   HENT:   Head: Normocephalic and atraumatic.   Neck: Normal range of motion.   Pulmonary/Chest: Effort normal. No respiratory distress. no masses over the breast.  No skin changes over the chest wall.  No discharge present on the bilateral nipples.  Cardiac: No murmurs, rubs, gallops. RRR.  Abdominal: Abdomen soft, nontender, nondistended. No rebound tenderness.  MSK: Long bones without deformity or evidence of trauma  Neurological: alert and oriented to person, place, and time. Gait stable.  No focal defects.  Pupils equal and reactive to light, 3 mm bilaterally.  Skin: Skin is warm and dry.   Psychiatric:  normal mood and affect.  behavior is normal. Thought content normal.     ED Course      Procedures              No results found for any visits on 06/27/22.  Medications - No data to display     Assessments & Plan (with Medical Decision Making)   MDM  Patient presenting with concern for breast discharge.  I cannot appreciate a mass, discharge or abnormalities on physical examination.  Differential includes prolactinoma, other endocrine abnormality, breast cancer, cellulitis, abscess.  Discussed with the patient will likely need some imaging however we cannot do that here in the emergency department.  He does have a primary care provider to follow-up with and he will review the results of his labs with his primary care provider and decide on imaging or further work-up.  No indication of infection, abscess, ACS, pneumonia, pneumothorax, pulmonary embolism on history or physical exam.  Vitals here are stable and he otherwise appears quite well.  Patient agrees with plan, he will return if he has any further concerns.    I have reviewed the nursing notes. I have reviewed the findings, " diagnosis, plan and need for follow up with the patient.    New Prescriptions    No medications on file       Final diagnoses:   Nipple discharge       --  [facility] EMERGENCY DEPARTMENT  6/27/2022     Leonel Hall MD  06/27/22 0549

## 2022-06-27 NOTE — DISCHARGE INSTRUCTIONS
Please make an appointment to follow up with Your Primary Care Provider as soon as you can. If you do not have a regular doctor, call the Primary Care Center (phone: [phone]) as soon as possible to discuss your lab results and to schedule imaging. You may need an ultrasound or mammogram.    If Sea has discomfort from fever or other pain, he can have:  Acetaminophen (Tylenol) every 6 hours as needed. His dose is:    2 regular strength tabs (650 mg)                                     (43.2+ kg/96+ lb)    NOTE: If your acetaminophen (Tylenol) came with a dropper marked with 0.4 and 0.8 ml, call us ([phone]) or check with your doctor about the dose before using it.     AND/OR      Ibuprofen (Advil, Motrin) every 6 hours as needed. His/her dose is:    2 regular strength tabs (400 mg)                                                                         (40-60 kg/ lb)

## 2022-06-28 ENCOUNTER — OFFICE VISIT (OUTPATIENT)
Dept: INTERNAL MEDICINE | Facility: CLINIC | Age: 22
End: 2022-06-28
Payer: COMMERCIAL

## 2022-06-28 ENCOUNTER — PATIENT OUTREACH (OUTPATIENT)
Dept: CARE COORDINATION | Facility: CLINIC | Age: 22
End: 2022-06-28

## 2022-06-28 VITALS
OXYGEN SATURATION: 99 % | SYSTOLIC BLOOD PRESSURE: 108 MMHG | WEIGHT: 115 LBS | RESPIRATION RATE: 16 BRPM | HEIGHT: 69 IN | DIASTOLIC BLOOD PRESSURE: 70 MMHG | HEART RATE: 87 BPM | BODY MASS INDEX: 17.03 KG/M2

## 2022-06-28 DIAGNOSIS — N64.52 NIPPLE DISCHARGE IN MALE: ICD-10-CM

## 2022-06-28 DIAGNOSIS — Z71.89 OTHER SPECIFIED COUNSELING: ICD-10-CM

## 2022-06-28 DIAGNOSIS — N62 GYNECOMASTIA: Primary | ICD-10-CM

## 2022-06-28 DIAGNOSIS — N62 GYNECOMASTIA, MALE: ICD-10-CM

## 2022-06-28 PROCEDURE — 99204 OFFICE O/P NEW MOD 45 MIN: CPT | Mod: GC

## 2022-06-28 ASSESSMENT — PAIN SCALES - GENERAL: PAINLEVEL: MILD PAIN (2)

## 2022-06-28 NOTE — PROGRESS NOTES
Primary Care Center  Internal Medicine    History of Present Illness:  21 year old Male with no past medical history came with chief complain of painful swelling of B/L Mammary glands and one episode of bloody discharge in right nipple and white/transparent discharge from left nipple.   He started having swelling of the bilateral mammary glands since 4 years. He had a bloody discharge in right nipple and white/transparent discharge of small quantity which occurred once yesterday, while he was showering and squeezed his nipples as it had been tender since 3 days. He had a similar incident in the past where he had white/transparent discharge from his nipples few years back. As it was not painful so it was not concerning for him to get it checked up. He also has pain and tenderness on his right armpit and pain over the right hand which was tingling in nature at night yesterday, which subsided by today. He mentions that he did not have any trauma in the mammary region. He denies any hormonal drugs, and recreational drugs intake.  He mentions no fever, central chest pain, SOB.  He mentions that he has no problem in sexual activity and has normal errection and normal puberty changes. No family history of breast cancer and any cancer known in the family.    On examination:    Mammary examination:  He has bilateral mild swelling of mammary gland. It is not erythematous on examination. Both nipple areas are tender (left>right). Tenderness present on right armpit. No discharge on expression of nipples.    Genital examination:   Phallus normal  Testicular size: 12-15    Physical Exam:  Vitals: @VS@  Constitutional: Alert, oriented, pleasant, no acute distress  Head: Normocephalic, atraumatic  Eyes: Extra-ocular movements intact, pupils equally round and reactive bilaterally, no scleral icterus  ENT: Oropharynx clear, moist mucus membranes, good dentition  Neck: Supple, no lymphadenopathy  Cardiovascular: Regular rate and  rhythm, no murmurs, rubs or gallops, peripheral pulses full/symmetric  Respiratory: Good air movement bilaterally, lungs clear, no wheezes/rales/rhonchi  Skin: No rashes/lesions  Psychiatric: normal mentation, affect and mood    Assessment:     1. Gynecomastia  DD:   Klenefelter syndrome  Hormonal Imbalance  Malignancy  Drug-induced(less likely)    2. 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 Vivar      Attending Addendum:  Patient seen and examined with resident in clinic today.  Pertinent portions of history and exam were independently verified by myself.  I agree with the exam and plan as outlined above with the following modifications: none.  Jessica Vivar MD  Internal Medicine

## 2022-06-28 NOTE — NURSING NOTE
Chief Complaint   Patient presents with     Hospital F/U       PAO Perez at 12:05 PM on 6/28/2022.

## 2022-06-28 NOTE — PROGRESS NOTES
[facility] [place]    Background: Care Coordination referral placed from [facility] discharge report for reason of patient meeting criteria for a TCM outreach call by [facility] team.    Assessment: Upon chart review, [facility] Team member will cancel/close the referral for TCM outreach due to reason below:    Patient has a follow up appointment with an appropriate provider today for hospital discharge.    Plan: Care Coordination referral for TCM outreach canceled.      Ashley Smith MA  [facility] [place], [facility]

## 2022-07-05 ENCOUNTER — LAB (OUTPATIENT)
Dept: LAB | Facility: CLINIC | Age: 22
End: 2022-07-05

## 2022-07-05 ENCOUNTER — OFFICE VISIT (OUTPATIENT)
Dept: INTERNAL MEDICINE | Facility: CLINIC | Age: 22
End: 2022-07-05
Payer: COMMERCIAL

## 2022-07-05 ENCOUNTER — ANCILLARY PROCEDURE (OUTPATIENT)
Dept: MAMMOGRAPHY | Facility: CLINIC | Age: 22
End: 2022-07-05
Attending: INTERNAL MEDICINE
Payer: COMMERCIAL

## 2022-07-05 VITALS
WEIGHT: 115 LBS | SYSTOLIC BLOOD PRESSURE: 127 MMHG | HEART RATE: 60 BPM | RESPIRATION RATE: 16 BRPM | OXYGEN SATURATION: 100 % | DIASTOLIC BLOOD PRESSURE: 87 MMHG | HEIGHT: 69 IN | BODY MASS INDEX: 17.03 KG/M2

## 2022-07-05 DIAGNOSIS — N64.52 NIPPLE DISCHARGE IN MALE: ICD-10-CM

## 2022-07-05 DIAGNOSIS — H57.11 EYE PAIN, RIGHT: Primary | ICD-10-CM

## 2022-07-05 DIAGNOSIS — H57.11 EYE PAIN, RIGHT: ICD-10-CM

## 2022-07-05 DIAGNOSIS — N62 GYNECOMASTIA, MALE: ICD-10-CM

## 2022-07-05 DIAGNOSIS — R13.10 DYSPHAGIA, UNSPECIFIED TYPE: ICD-10-CM

## 2022-07-05 LAB
CRP SERPL-MCNC: <3 MG/L
ESTRADIOL SERPL-MCNC: 34 PG/ML
FSH SERPL IRP2-ACNC: 4.8 MIU/ML (ref 1.5–12.4)
HCG-TM SERPL-ACNC: <3 IU/L
LH SERPL-ACNC: 8.2 MIU/ML (ref 1.7–8.6)
SHBG SERPL-SCNC: 58 NMOL/L (ref 11–80)

## 2022-07-05 PROCEDURE — 86140 C-REACTIVE PROTEIN: CPT | Mod: 90 | Performed by: PATHOLOGY

## 2022-07-05 PROCEDURE — 84403 ASSAY OF TOTAL TESTOSTERONE: CPT | Mod: 90 | Performed by: PATHOLOGY

## 2022-07-05 PROCEDURE — 76642 ULTRASOUND BREAST LIMITED: CPT | Mod: 50 | Performed by: STUDENT IN AN ORGANIZED HEALTH CARE EDUCATION/TRAINING PROGRAM

## 2022-07-05 PROCEDURE — 99214 OFFICE O/P EST MOD 30 MIN: CPT | Mod: GC

## 2022-07-05 PROCEDURE — 84702 CHORIONIC GONADOTROPIN TEST: CPT | Mod: 90 | Performed by: PATHOLOGY

## 2022-07-05 PROCEDURE — 82670 ASSAY OF TOTAL ESTRADIOL: CPT | Mod: 90 | Performed by: PATHOLOGY

## 2022-07-05 PROCEDURE — 83001 ASSAY OF GONADOTROPIN (FSH): CPT | Mod: 90 | Performed by: PATHOLOGY

## 2022-07-05 PROCEDURE — 36415 COLL VENOUS BLD VENIPUNCTURE: CPT | Performed by: PATHOLOGY

## 2022-07-05 PROCEDURE — 83002 ASSAY OF GONADOTROPIN (LH): CPT | Mod: 90 | Performed by: PATHOLOGY

## 2022-07-05 PROCEDURE — 84270 ASSAY OF SEX HORMONE GLOBUL: CPT | Mod: 90 | Performed by: PATHOLOGY

## 2022-07-05 PROCEDURE — 99000 SPECIMEN HANDLING OFFICE-LAB: CPT | Performed by: PATHOLOGY

## 2022-07-05 NOTE — NURSING NOTE
Sea Ash is a 21 year old male patient that presents today in clinic for the following:    Chief Complaint   Patient presents with     Throat Problem     Pt states difficulty swallowing     Mouth/Lip Problem     Eye Problem     Pt states blurriness in right eye     The patient's allergies and medications were reviewed as noted. A set of vitals were recorded as noted without incident. The patient does not have any other questions for the provider.    Nicole Martinez, EMT at 11:58 AM on 7/5/2022

## 2022-07-05 NOTE — PATIENT INSTRUCTIONS
Fabienne Osei,    It was a pleasure seeing you in the clinic today. We discussed your eye pain and difficulty swallowing.    I have ordered an eye clinic referral so you can have a formal eye exam.  I have also ordered a referral to the speech pathologist so they can perform a video swallow study for you.     Let's follow up towards the end of August to see how things are going. If you have worsening of your symptoms before then, feel free to send the clinic a message on the [employer] portal. The staff will forward your message to me if you saw you would like the message to go to me.       Please do not hesitate to reach out to me or schedule an appointment if you have any questions or concerns.     All the best,    Dr. Mcnulty

## 2022-07-05 NOTE — PROGRESS NOTES
PRIMARY CARE CENTER         HPI:      HPI:  Sea Ash is a 21 year old male with no significant PMH who presents for dry eye, blurry vision and dfficulty swallowing. States he has been having Right eye blurrinessx 6 months. Also has some pain when he wakes up at the medial side of the eye. Sharp pain deep in the eye and feels like something is stuck inside it. Pain started about 1 month. Takes about 20 mins in the morning for his vision to become clear in the R eye. Feels gritty only in his R eye.  No recent trauma. Intermittent blurriness. Last saw ED doc a few months ago for the blurry vision who said his vision is normal. Denies allergies. Also endorses drynessat corners of his moth and dry lips x 1 year. Uses chapstick, but this does not improve it. Denies dry mouth, joint pain, weight loss, night sweats, fevers, changes in mood, increased anxiety, chills, chest pain, SOB, abd pain, N/V/D.     Also complains of dysphagia. Sometimes has difficulty swallowing solids. This issue has been going on for a few weeks. Has been getting progressively worse. Hard foods are worse to swallow. Feels like food gets stuck at the back of his throat and he has to use water to make it go down.     Surgical History:  none    Family History:  none    Social History:  Live in [place] with his older brother of 25 years. States family life is good. He is in grad school studying business. Has 2 more years left in school  EtOH- denies  Smoking- denies  Illicit drugs- denies    Medications:  Current Outpatient Medications   Medication     hydrOXYzine (ATARAX) 25 MG tablet     No current facility-administered medications for this visit.      Allergies:   No Known Allergies    Medical History:  No past medical history on file.    Problem, Medication and Allergy Lists were reviewed and are current.  Patient is an established patient of this clinic.          Review of Systems:     10 point ROS negative unless otherwise specified in  "HPI  ROS  I have personally reviewed and updated the complete ROS on the day of the visit.           Physical Exam:   /87 (BP Location: Right arm, Patient Position: Sitting, Cuff Size: Adult Regular)   Pulse 60   Resp 16   Ht 1.74 m (5' 8.5\")   Wt 52.2 kg (115 lb)   SpO2 100%   BMI 17.23 kg/m    Body mass index is 17.23 kg/m .  Vitals were reviewed    Physical Exam:   General: Sitting upright, talking in complete sentences, non-distressed  HEENT: Normocephalic, atraumatic, PERRL, EOMI, no conjunctival pallor, anicteric, nares patent, oropharynx clear and moist, mild tearing after eye exam. No conjunctival injection.   CVS: S1/S2 WNL, RRR, no murmur, no LE edema  Pulm: Non-labored breathing, vesicular breath sounds, no crackles or wheeze  Abdo: Non-distended, non-tender to palpation, no rebound or guarding, BS present   MSK: No obvious bony deformities, no clubbing  Skin: Warm and dry, no obvious rashes  Neuro: Alert and oriented x3, non-focal   Psych: Normal mood and affect       Results:      Results from the last 24 hours  Results for orders placed or performed in visit on 07/05/22 (from the past 24 hour(s))   Testosterone Free and Total    Narrative    The following orders were created for panel order Testosterone Free and Total.  Procedure                               Abnormality         Status                     ---------                               -----------         ------                     Sex Hormone Binding Glob...[[id number]]                      In process                 Testosterone Free and Total[[id number]]                      In process                   Please view results for these tests on the individual orders.   Luteinizing Hormone, Adult   Result Value Ref Range    Lutropin 8.2 1.7 - 8.6 mIU/mL   Estradiol   Result Value Ref Range    Estradiol 34 pg/mL   Follicle stimulating hormone   Result Value Ref Range    FSH 4.8 1.5 - 12.4 mIU/mL     Assessment and Plan     Sea was " seen today for throat problem, mouth/lip problem and eye problem.    Diagnoses and all orders for this visit:    # Eye pain, right  Blurry vision x 6 months and eye pain x 1 month. Also endorsing dry lips x 1 year. Eye showed no trauma or foreign body and no conjunctival injection. Pt has never had a formal eye exam so we will order an eye clinic referral. Also will order CRP to evaluate for possible rhem disorder. Rheum conditions lower on ddx as no fever, joint pains, dry mouth, or fhx of rheum conditions. Recent CBC and BMP in 6/2022   -     CRP inflammation; Future   -     Adult Eye Referral; Future    # Dysphagia, unspecified type  Worsening Dysphagia x 3 weeks. Pt feels like food gets stuck in back of throat. Also endorses bitter/sour taste in his mouth occasionally. Hx more concerning for oropharyngeal issue over a motility disorder at this time. Would like to rule out strictures and motility disorder prior to proceeding with endoscopy. Ordered a video swallow study   -     Speech Therapy Referral; Future    Options for treatment and follow-up care were reviewed with the patient. Sea Ash engaged in the decision making process and verbalized understanding of the options discussed and agreed with the final plan.    F/u with Dr Mcnulty in 2 months.     Pt was seen and plan of care discussed with Dr Vivar.    Sasha Mcnulty MD  Internal Medicine-PGY2  [facility]  Pager: [phone]  Jul 5, 2022    Attending Addendum:  Patient seen and examined with resident in clinic today.  Pertinent portions of history and exam were independently verified by myself.  I agree with the exam and plan as outlined above with the following modifications: none.  Jessica Vivar MD  Internal Medicine

## 2022-07-06 LAB
TESTOST FREE SERPL-MCNC: 11.84 NG/DL
TESTOST SERPL-MCNC: 764 NG/DL (ref 240–950)

## 2022-07-13 ENCOUNTER — OFFICE VISIT (OUTPATIENT)
Dept: OPTOMETRY | Facility: CLINIC | Age: 22
End: 2022-07-13
Payer: COMMERCIAL

## 2022-07-13 DIAGNOSIS — H52.223 REGULAR ASTIGMATISM OF BOTH EYES: Primary | ICD-10-CM

## 2022-07-13 DIAGNOSIS — H57.11 EYE PAIN, RIGHT: ICD-10-CM

## 2022-07-13 PROCEDURE — 92015 DETERMINE REFRACTIVE STATE: CPT | Performed by: OPTOMETRIST

## 2022-07-13 PROCEDURE — 92004 COMPRE OPH EXAM NEW PT 1/>: CPT | Performed by: OPTOMETRIST

## 2022-07-13 ASSESSMENT — SLIT LAMP EXAM - LIDS
COMMENTS: NORMAL
COMMENTS: NORMAL

## 2022-07-13 ASSESSMENT — EXTERNAL EXAM - RIGHT EYE: OD_EXAM: NORMAL

## 2022-07-13 ASSESSMENT — KERATOMETRY
OD_K1POWER_DIOPTERS: 45.00
OS_AXISANGLE_DEGREES: 85
OD_K2POWER_DIOPTERS: 47.25
OS_AXISANGLE2_DEGREES: 175
OS_K1POWER_DIOPTERS: 44.75
OD_AXISANGLE_DEGREES: 96
OD_AXISANGLE2_DEGREES: 6
OS_K2POWER_DIOPTERS: 48.25

## 2022-07-13 ASSESSMENT — VISUAL ACUITY
OD_PH_SC: 20/40
OD_SC: 20/20
OS_SC+: -1
METHOD: SNELLEN - LINEAR
OD_SC: 20/60
OD_PH_SC+: -1
OS_SC: 20/20
OS_PH_SC: 20/40
OS_SC: 20/70

## 2022-07-13 ASSESSMENT — REFRACTION_MANIFEST
OD_AXIS: 101
OS_AXIS: 085
OS_CYLINDER: +3.75
OD_CYLINDER: +3.00
OD_AXIS: 100
OS_SPHERE: -4.25
OD_SPHERE: -2.50
OS_AXIS: 085
OD_SPHERE: -3.50
OS_SPHERE: -4.25
OD_CYLINDER: +3.00
OS_CYLINDER: +2.25
METHOD_AUTOREFRACTION: 1

## 2022-07-13 ASSESSMENT — TONOMETRY
IOP_METHOD: APPLANATION
OS_IOP_MMHG: 14
OD_IOP_MMHG: 12

## 2022-07-13 ASSESSMENT — CONF VISUAL FIELD
OD_NORMAL: 1
OS_NORMAL: 1

## 2022-07-13 ASSESSMENT — EXTERNAL EXAM - LEFT EYE: OS_EXAM: NORMAL

## 2022-07-13 NOTE — PROGRESS NOTES
Chief Complaint   Patient presents with     Annual Eye Exam         Last Eye Exam: First eye exam   Dilated Previously: No, side effects of dilation explained today    What are you currently using to see?  does not use glasses or contacts       Distance Vision Acuity: Noticed gradual change in both eyes    Near Vision Acuity: Satisfied with vision while reading and using computer unaided    Eye Comfort: pain in inner corner of right eye. Feels like eye is much smaller than the left eye.  Do you use eye drops? 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       OBJECTIVE: See Ophthalmology exam    ASSESSMENT:    ICD-10-CM    1. Regular astigmatism of both eyes - Both Eyes  H52.223 EYE EXAM (SIMPLE-NONBILLABLE)     REFRACTION   2. Eye pain, right  H57.11 Adult Eye Referral     EYE EXAM (SIMPLE-NONBILLABLE)     REFRACTION       PLAN:     Patient Instructions   Astigmatism results from curvature differential in the cornea and crystalline lens which can cause a distorted image, as light rays are prevented from meeting at a common focus.      Eyeglass prescription given.        The affects of the dilating drops last for 4- 6 hours.  You will be more sensitive to light and vision will be blurry up close.  Do not drive if you do not feel comfortable.  Mydriatic sunglasses were given if needed.    Recommend annual eye exams.    Giselle Quinones O.D.  [address] [address]    [phone]

## 2022-07-13 NOTE — PATIENT INSTRUCTIONS
Astigmatism results from curvature differential in the cornea and crystalline lens which can cause a distorted image, as light rays are prevented from meeting at a common focus.      Eyeglass prescription given.        The affects of the dilating drops last for 4- 6 hours.  You will be more sensitive to light and vision will be blurry up close.  Do not drive if you do not feel comfortable.  Mydriatic sunglasses were given if needed.    Recommend annual eye exams.    Giselle Quinones O.D.  [address] [address] [phone]

## 2022-07-13 NOTE — LETTER
7/13/2022         RE: Sea Ash  [address]  [place] [address]        Dear Colleague,    Thank you for referring your patient, Sea Ash, to the [facility]. Please see a copy of my visit note below.    Chief Complaint   Patient presents with     Annual Eye Exam         Last Eye Exam: First eye exam   Dilated Previously: No, side effects of dilation explained today    What are you currently using to see?  does not use glasses or contacts       Distance Vision Acuity: Noticed gradual change in both eyes    Near Vision Acuity: Satisfied with vision while reading and using computer unaided    Eye Comfort: pain in inner corner of right eye. Feels like eye is much smaller than the left eye.  Do you use eye drops? 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       OBJECTIVE: See Ophthalmology exam    ASSESSMENT:    ICD-10-CM    1. Regular astigmatism of both eyes - Both Eyes  H52.223 EYE EXAM (SIMPLE-NONBILLABLE)     REFRACTION   2. Eye pain, right  H57.11 Adult Eye Referral     EYE EXAM (SIMPLE-NONBILLABLE)     REFRACTION       PLAN:     Patient Instructions   Astigmatism results from curvature differential in the cornea and crystalline lens which can cause a distorted image, as light rays are prevented from meeting at a common focus.      Eyeglass prescription given.        The affects of the dilating drops last for 4- 6 hours.  You will be more sensitive to light and vision will be blurry up close.  Do not drive if you do not feel comfortable.  Mydriatic sunglasses were given if needed.    Recommend annual eye exams.    Giselle Quinones O.D.  [facility]   [address] [place] [address]    [phone]           Again, thank you for allowing me to participate in the care of your patient.        Sincerely,        Giselle Lane  Joni, OD

## 2022-07-25 ENCOUNTER — TELEPHONE (OUTPATIENT)
Dept: INTERNAL MEDICINE | Facility: CLINIC | Age: 22
End: 2022-07-25

## 2022-07-25 NOTE — TELEPHONE ENCOUNTER
Video swallow and Speech therapy order faxed.      Jad Peacock CMA ([facility]) at 8:34 AM on 7/25/2022

## 2022-07-25 NOTE — TELEPHONE ENCOUNTER
[facility] Call Center    Phone Message    May a detailed message be left on voicemail: yes     Reason for Call: Other: Need order for video swallow for the appt at 10am at the [facility] need to be faxed to [phone].     Action Taken: Message routed to:  [facility] ([facility]    Travel Screening: Not Applicable

## 2022-07-26 ENCOUNTER — HOSPITAL ENCOUNTER (OUTPATIENT)
Dept: GENERAL RADIOLOGY | Facility: CLINIC | Age: 22
Discharge: HOME | End: 2022-07-26
Attending: INTERNAL MEDICINE
Payer: COMMERCIAL

## 2022-07-26 ENCOUNTER — HOSPITAL ENCOUNTER (OUTPATIENT)
Dept: SPEECH THERAPY | Facility: CLINIC | Age: 22
Discharge: HOME | End: 2022-07-26
Attending: INTERNAL MEDICINE
Payer: COMMERCIAL

## 2022-07-26 ENCOUNTER — APPOINTMENT (OUTPATIENT)
Dept: OPTOMETRY | Facility: CLINIC | Age: 22
End: 2022-07-26
Payer: COMMERCIAL

## 2022-07-26 DIAGNOSIS — R13.10 DYSPHAGIA, UNSPECIFIED TYPE: ICD-10-CM

## 2022-07-26 PROCEDURE — 92340 FIT SPECTACLES MONOFOCAL: CPT | Performed by: OPTOMETRIST

## 2022-07-26 PROCEDURE — 74230 X-RAY XM SWLNG FUNCJ C+: CPT

## 2022-07-26 PROCEDURE — 92611 MOTION FLUOROSCOPY/SWALLOW: CPT | Mod: GN

## 2022-07-26 NOTE — TELEPHONE ENCOUNTER
Rose from [facility] requesting re- fax of order, stated it was not received, fax number [phone]

## 2022-07-26 NOTE — PROGRESS NOTES
Video Fluoroscopic Swallow Study (VFSS)     07/26/22 1020   General Information   Type Of Visit Initial   Start Of Care Date 07/26/22   Referring Physician Jessica Vivar MD   Orders Evaluate And Treat   Medical Diagnosis dysphagia, unspecified type (R13.10)   Onset Of Illness/injury Or Date Of Surgery 07/06/22  (order date)   Precautions/limitations No Known Precautions/limitations   Hearing WFL   Pertinent History of Current Problem/OT: Additional Occupational Profile Info   Pt with recent dysphagia onset identified as foods sticking in his throat, requiring lots of water/liquids to push it down. This has been occuring for ~3 months, he reports it is progressing and that he does not eat as much because of it, has had ~10 lb weight loss since symptom onset. He denies any reflux symptoms when asked. No significant PMHx per EMR.     Respiratory Status Room air   Prior Level Of Function Swallowing   Prior Level Of Function Comment regular/thin   General Observations Pt walked into clinic IND, upright in chair + standing for evaluation   Patient/family Goals To fix swallowing   Abuse Screen (yes response referral indicated)   Feels Unsafe at Home or Work/School no   Feels Threatened by Someone no   Does Anyone Try to Keep You From Having Contact with Others or Doing Things Outside Your Home? no   Physical Signs of Abuse Present no   VFSS Evaluation   VFSS Additional Documentation Yes   VFSS Eval: Radiology   Radiologist DR. Daniel   Views Taken left lateral;A/P   Physical Location of Procedure [facility], radiology dept, fluoroscopy suite   VFSS Eval: Thin Liquid Texture Trial   Mode of Presentation, Thin Liquid cup;straw;self-fed   Order of Presentation 1, 2, 7   Preparatory Phase Poor bolus control   Oral Phase, Thin Liquid Premature pharyngeal entry  (mild to the pyriform sinuses)   Pharyngeal Phase, Thin Liquid WFL   Rosenbek's Penetration Aspiration Scale: Thin Liquid Trial Results  2 - contrast enters airway, remains above the vocal cords, no residue remains (penetration)   Diagnostic Statement Mild premature bolus spillage to the pyriform sinuses noted with thin liquids, resulting in trace before/during flash penetration *judged functional.   VFSS Eval: Puree Solid Texture Trial   Mode of Presentation, Puree self-fed   Order of Presentation 3   Preparatory Phase WFL   Oral Phase, Puree WFL   Pharyngeal Phase, Puree WFL   Rosenbek's Penetration Aspiration Scale: Puree Food Trial Results 1 - no aspiration, contrast does not enter airway   VFSS Eval: Regular Texture Trial (Solid)   Mode of Presentation self-fed   Order of Presentation 4, 5, 6   Preparatory Phase WFL   Oral Phase WFL   Pharyngeal Phase WFL   Rosenbek's Penetration Aspiration Scale 1 - no aspiration, contrast does not enter airway   Educational Assessment   Barriers to Learning No barriers   Preferred Learning Style Listening;Pictures/video   Esophageal Phase of Swallow   Patient reports or presents with symptoms of esophageal dysphagia Yes   Esophageal sweep performed during today s vidofluoroscopic exam  Yes;Please refer to radiologist's report for details   Esophageal comments . Potential residuals/slow movement of bolus through esophagus -would consider esophageal dysphagia/motility work up for further assessment.   Swallow Eval: Clinical Impressions   Skilled Criteria for Therapy Intervention No problems identified which require skilled intervention   Functional Assessment Scale (FAS) 6   Dysphagia Outcome Severity Scale (EVI) Level 6 - EVI   Treatment Diagnosis functional oropharyngeal swallow   Diet texture recommendations Regular diet;Thin liquids (level 0)   Demonstrates Need for Referral to Another Service   (GI)   Anticipated Discharge Disposition home   Patient, family and/or staff in agreement with Plan of Care Yes   Clinical Impression Comments   Video fluroscopic swallow study completed with thin liquids, puree  solids, regular solids in lateral and anteroposterior positioning. Pt currently presents with a functional oropharyngeal swallow. The following components of swallow are WFL: mastication, oral propulsion, oral clearance, base of tongue retraction, hyolaryngeal elevation/excursion, epiglottic inversion, pharyngeal constriction, upper esophageal sphincter relaxation during the swallow. Mild premature bolus spillage to the pyriform sinuses noted with thin liquids, resulting in trace before/during flash penetration *judged functional. No other laryngeal penetration or aspiration noted. No oropharyngeal bolus retention.     Potential residuals/slow movement of bolus through esophagus - would consider esophageal dysphagia/motility work up for further assessment.    Pt educated on results of assessment, including video review of cine clips with education re: oropharyngeal anatomy/physiology, WFL oropharyngeal swallow, potential further workup pending provider orders. All questions answered.      Total Session Time   SLP Eval: VideoFluoroscopic Swallow function Minutes ([id number]) 12   Total Evaluation Time 12

## 2022-08-12 NOTE — DISCHARGE INSTRUCTIONS
TODAY'S VISIT:  You were seen today for lymphadenopathy    - You may take Ibuprofen and/or Tylenol as needed for pain. You can take 600 mg of Ibuprofen every 6 hours and 1 g Tylenol every 6 hours. It may help to alternate these, so you take something every 3 hours.     - cepacol lozenges may also help with your symptoms  -   - If you had any labs or imaging/radiology tests performed today, you should also discuss these tests with your usual provider.       OTHER INSTRUCTIONS:  -You should continue to self isolate/quarantine at home until your covid test results negative. If it is positive, you should continue to quarantine for 14 days after your symptoms started or until 24 hours after your fevers have resolved and all other symptoms are improving, whichever is longer  - If your test is positive, your housemates should self isolate/quarantine at home for 14 days after the last day they were in contact with you, or 7 days with a negative COVID-19 test at the end of the 7-day.  If they are fully vaccinated for COVID-19 (at least 14 days after the second vaccine in a 2 dose series), they do not need to quarantine.  -If your test is positive, to minimize the risk of exposing your housemates to COVID-19, you should stay in your own room as much as possible, use a separate bathroom from everyone else in the house if possible, avoid being in any common areas when your housemates are also in them, and avoid sharing any personal items, particularly dishes/silverware/cup/etc.  Make sure to sanitize surfaces in your home that are frequently touched often.  You and your housemates should also make sure to frequently wash your hands.    FOLLOW-UP:  Please make an appointment to follow up with:  - Your Primary Care Provider. If you do not have a PCP, please call the Primary Care Center (phone: [phone] for an appointment    - Have your provider review the results from today's visit with you again to make sure no further  follow-up or additional testing is needed based on those results.     RETURN TO THE EMERGENCY DEPARTMENT  Return to the Emergency Department at any time for any new or worsening symptoms or any concerns.     22

## 2022-08-14 ENCOUNTER — HEALTH MAINTENANCE LETTER (OUTPATIENT)
Age: 22
End: 2022-08-14

## 2022-09-01 ENCOUNTER — APPOINTMENT (OUTPATIENT)
Dept: GENERAL RADIOLOGY | Facility: CLINIC | Age: 22
End: 2022-09-01
Attending: EMERGENCY MEDICINE
Payer: COMMERCIAL

## 2022-09-01 ENCOUNTER — HOSPITAL ENCOUNTER (EMERGENCY)
Facility: CLINIC | Age: 22
Discharge: HOME | End: 2022-09-01
Attending: EMERGENCY MEDICINE | Admitting: EMERGENCY MEDICINE
Payer: COMMERCIAL

## 2022-09-01 VITALS
HEART RATE: 99 BPM | BODY MASS INDEX: 17.22 KG/M2 | TEMPERATURE: 98.7 F | DIASTOLIC BLOOD PRESSURE: 56 MMHG | SYSTOLIC BLOOD PRESSURE: 97 MMHG | WEIGHT: 114.9 LBS | RESPIRATION RATE: 16 BRPM | OXYGEN SATURATION: 99 %

## 2022-09-01 DIAGNOSIS — U07.1 INFECTION DUE TO 2019 NOVEL CORONAVIRUS: ICD-10-CM

## 2022-09-01 LAB
FLUAV RNA SPEC QL NAA+PROBE: NEGATIVE
FLUBV RNA RESP QL NAA+PROBE: NEGATIVE
GROUP A STREP BY PCR: NOT DETECTED
RSV RNA SPEC NAA+PROBE: NEGATIVE
SARS-COV-2 RNA RESP QL NAA+PROBE: POSITIVE

## 2022-09-01 PROCEDURE — 250N000013 HC RX MED GY IP 250 OP 250 PS 637: Performed by: EMERGENCY MEDICINE

## 2022-09-01 PROCEDURE — 99284 EMERGENCY DEPT VISIT MOD MDM: CPT | Mod: CS | Performed by: EMERGENCY MEDICINE

## 2022-09-01 PROCEDURE — 87651 STREP A DNA AMP PROBE: CPT | Performed by: EMERGENCY MEDICINE

## 2022-09-01 PROCEDURE — 99285 EMERGENCY DEPT VISIT HI MDM: CPT | Mod: CS,25 | Performed by: EMERGENCY MEDICINE

## 2022-09-01 PROCEDURE — 87637 SARSCOV2&INF A&B&RSV AMP PRB: CPT | Performed by: EMERGENCY MEDICINE

## 2022-09-01 PROCEDURE — 93005 ELECTROCARDIOGRAM TRACING: CPT | Performed by: EMERGENCY MEDICINE

## 2022-09-01 PROCEDURE — C9803 HOPD COVID-19 SPEC COLLECT: HCPCS | Performed by: EMERGENCY MEDICINE

## 2022-09-01 PROCEDURE — 71046 X-RAY EXAM CHEST 2 VIEWS: CPT

## 2022-09-01 RX ORDER — ACETAMINOPHEN 500 MG
1000 TABLET ORAL ONCE
Status: COMPLETED | OUTPATIENT
Start: 2022-09-01 | End: 2022-09-01

## 2022-09-01 RX ORDER — IBUPROFEN 200 MG
400 TABLET ORAL ONCE
Status: COMPLETED | OUTPATIENT
Start: 2022-09-01 | End: 2022-09-01

## 2022-09-01 RX ADMIN — IBUPROFEN 400 MG: 200 TABLET, FILM COATED ORAL at 23:13

## 2022-09-01 RX ADMIN — ACETAMINOPHEN 1000 MG: 500 TABLET ORAL at 23:13

## 2022-09-01 ASSESSMENT — ACTIVITIES OF DAILY LIVING (ADL)
ADLS_ACUITY_SCORE: 33
ADLS_ACUITY_SCORE: 35

## 2022-09-02 LAB
ATRIAL RATE - MUSE: 68 BPM
DIASTOLIC BLOOD PRESSURE - MUSE: NORMAL MMHG
INTERPRETATION ECG - MUSE: NORMAL
P AXIS - MUSE: -22 DEGREES
PR INTERVAL - MUSE: 134 MS
QRS DURATION - MUSE: 82 MS
QT - MUSE: 366 MS
QTC - MUSE: 389 MS
R AXIS - MUSE: -29 DEGREES
SYSTOLIC BLOOD PRESSURE - MUSE: NORMAL MMHG
T AXIS - MUSE: -13 DEGREES
VENTRICULAR RATE- MUSE: 68 BPM

## 2022-09-02 NOTE — DISCHARGE INSTRUCTIONS
Please make an appointment to follow up with Primary Care - [facility] (phone: [phone]) in 5-7 days as needed.

## 2022-09-04 NOTE — ED PROVIDER NOTES
ED Provider Note  [facility]      History     Chief Complaint   Patient presents with     Generalized Body Aches     Reports feeling feverish, sore throat, ear ache and left face some numbness. Small amount of nose bleed and bleeding from his ear.     HPI  Sea Ash is a 22 year old male who reports fever sore throat generalized myalgias and earache for the past few days.  Denies any chest pain or shortness of breath.     Past Medical History  History reviewed. No pertinent past medical history.  History reviewed. No pertinent surgical history.  hydrOXYzine (ATARAX) 25 MG tablet      No Known Allergies  Family History  History reviewed. No pertinent family history.  Social History   Social History     Tobacco Use     Smoking status: Never Smoker     Smokeless tobacco: Never Used   Substance Use Topics     Alcohol use: No     Drug use: No      Past medical history, past surgical history, medications, allergies, family history, and social history were reviewed with the patient. No additional pertinent items.       Review of Systems  A complete review of systems was performed with pertinent positives and negatives noted in the HPI, and all other systems negative.    Physical Exam   BP: 97/56  Pulse: 99  Temp: 98.7  F (37.1  C)  Resp: 16  Weight: 52.1 kg (114 lb 14.4 oz)  SpO2: 99 %  Physical Exam  Vitals and nursing note reviewed.   Constitutional:       General: He is not in acute distress.     Appearance: Normal appearance. He is not diaphoretic.   HENT:      Head: Atraumatic.   Eyes:      General: No scleral icterus.     Pupils: Pupils are equal, round, and reactive to light.   Cardiovascular:      Rate and Rhythm: Normal rate and regular rhythm.      Heart sounds: Normal heart sounds.   Pulmonary:      Effort: No respiratory distress.      Breath sounds: Normal breath sounds.   Abdominal:      General: Bowel sounds are normal.      Palpations: Abdomen is soft.      Tenderness: There  is no abdominal tenderness.   Musculoskeletal:         General: No tenderness.   Skin:     General: Skin is warm.      Findings: No rash.   Neurological:      General: No focal deficit present.      Mental Status: He is alert and oriented to person, place, and time.           ED Course      Procedures                   Results for orders placed or performed during the hospital encounter of 09/01/22   XR Chest 2 Views     Status: None    Narrative    EXAM: XR CHEST 2 VIEWS  LOCATION: [facility]  DATE/TIME: 9/1/2022 5:49 PM    INDICATION: Fever, cough.  COMPARISON: 04/11/2010.      Impression    IMPRESSION: Negative chest.   Symptomatic; Unknown Influenza A/B & SARS-CoV2 (COVID-19) Virus PCR Multiplex Nasopharyngeal     Status: Abnormal    Specimen: Nasopharyngeal; Swab   Result Value Ref Range    Influenza A PCR Negative Negative    Influenza B PCR Negative Negative    RSV PCR Negative Negative    SARS CoV2 PCR Positive (A) Negative    Narrative    Testing was performed using the Xpert Xpress CoV2/Flu/RSV Assay on the [employer] GeneXpert Instrument. This test should be ordered for the detection of SARS-CoV-2 and influenza viruses in individuals who meet clinical and/or epidemiological criteria. Test performance is unknown in asymptomatic patients. This test is for in vitro diagnostic use under the FDA EUA for laboratories certified under CLIA to perform high or moderate complexity testing. This test has not been FDA cleared or approved. A negative result does not rule out the presence of PCR inhibitors in the specimen or target RNA in concentration below the limit of detection for the assay. If only one viral target is positive but coinfection with multiple targets is suspected, the sample should be re-tested with another FDA cleared, approved, or authorized test, if coinfection would change clinical management. This test was validated by the [facility].  These laboratories are certified under the Clinical  Laboratory Improvement Amendments of 1988 (CLIA-88) as qualified to perform high complexity laboratory testing.   EKG 12-lead, tracing only     Status: None   Result Value Ref Range    Systolic Blood Pressure  mmHg    Diastolic Blood Pressure  mmHg    Ventricular Rate 68 BPM    Atrial Rate 68 BPM    IN Interval 134 ms    QRS Duration 82 ms     ms    QTc 389 ms    P Axis -22 degrees    R AXIS -29 degrees    T Axis -13 degrees    Interpretation ECG       Sinus rhythm  Minimal voltage criteria for LVH, may be normal variant  Septal infarct , age undetermined  Inferior infarct , age undetermined  Abnormal ECG  Unconfirmed report - interpretation of this ECG is computer generated - see medical record for final interpretation  Confirmed by - EMERGENCY ROOM, PHYSICIAN ([id number]),  RAGHU JIMENES ([id number]) on 9/2/2022 7:13:36 AM     Group A Streptococcus PCR Throat Swab     Status: Normal    Specimen: Throat; Swab   Result Value Ref Range    Group A strep by PCR Not Detected Not Detected    Narrative    The Xpert Xpress Strep A test, performed on the [employer]  Instrument Systems, is a rapid, qualitative in vitro diagnostic test for the detection of Streptococcus pyogenes (Group A ß-hemolytic Streptococcus, Strep A) in throat swab specimens from patients with signs and symptoms of pharyngitis. The Xpert Xpress Strep A test can be used as an aid in the diagnosis of Group A Streptococcal pharyngitis. The assay is not intended to monitor treatment for Group A Streptococcus infections. The Xpert Xpress Strep A test utilizes an automated real-time polymerase chain reaction (PCR) to detect Streptococcus pyogenes DNA.     Medications   ibuprofen (ADVIL/MOTRIN) tablet 400 mg (400 mg Oral Given 9/1/22 2313)   acetaminophen (TYLENOL) tablet 1,000 mg (1,000 mg Oral Given 9/1/22 2313)        Assessments & Plan (with Medical Decision Making)     22 year old male who reports fever  sore throat generalized myalgias and earache for the past few days.  COVID-19 swab positive.  Patient discharged home with plan to follow-up with primary care  For further evaluation care.             I have reviewed the nursing notes. I have reviewed the findings, diagnosis, plan and need for follow up with the patient.    Discharge Medication List as of 9/1/2022 11:15 PM          Final diagnoses:   Infection due to 2019 novel coronavirus       --  Yoon Cameron MD   [facility] EMERGENCY DEPARTMENT  9/1/2022     Yoon Cameron MD  09/06/22 2240

## 2022-09-23 ENCOUNTER — OFFICE VISIT (OUTPATIENT)
Dept: FAMILY MEDICINE | Facility: CLINIC | Age: 22
End: 2022-09-23
Payer: COMMERCIAL

## 2022-09-23 ENCOUNTER — TELEPHONE (OUTPATIENT)
Dept: FAMILY MEDICINE | Facility: CLINIC | Age: 22
End: 2022-09-23

## 2022-09-23 VITALS
HEART RATE: 81 BPM | HEIGHT: 69 IN | OXYGEN SATURATION: 99 % | WEIGHT: 115 LBS | SYSTOLIC BLOOD PRESSURE: 107 MMHG | RESPIRATION RATE: 16 BRPM | DIASTOLIC BLOOD PRESSURE: 74 MMHG | BODY MASS INDEX: 17.03 KG/M2 | TEMPERATURE: 98.9 F

## 2022-09-23 DIAGNOSIS — L64.9 ANDROGENETIC ALOPECIA: ICD-10-CM

## 2022-09-23 DIAGNOSIS — L71.0 PERIORAL DERMATITIS: Primary | ICD-10-CM

## 2022-09-23 PROCEDURE — 99203 OFFICE O/P NEW LOW 30 MIN: CPT | Performed by: FAMILY MEDICINE

## 2022-09-23 RX ORDER — PIMECROLIMUS 10 MG/G
CREAM TOPICAL 2 TIMES DAILY
Qty: 30 G | Refills: 11 | Status: SHIPPED | OUTPATIENT
Start: 2022-09-23 | End: 2022-12-06

## 2022-09-23 RX ORDER — FINASTERIDE 1 MG/1
1 TABLET, FILM COATED ORAL DAILY
Qty: 30 TABLET | Refills: 11 | Status: SHIPPED | OUTPATIENT
Start: 2022-09-23 | End: 2022-09-26

## 2022-09-23 NOTE — PATIENT INSTRUCTIONS
Patient Education   Here is the plan from today's visit    1. Perioral dermatitis  Use only when rash is present, if rash gone, can stop using and use for flare ups  - pimecrolimus (ELIDEL) 1 % external cream; Apply topically 2 times daily  Dispense: 30 g; Refill: 11    2. Androgenetic alopecia  Discussed risks of lower libido and erectile dysfunction side effects. Need to reassess progress with hair loss/growth within 1 year  - finasteride (PROPECIA) 1 MG tablet; Take 1 tablet (1 mg) by mouth daily  Dispense: 30 tablet; Refill: 11    Please call or return to clinic if your symptoms don't go away.    Follow up plan  Get COVID vaccine after 11/1/22    Thank you for coming to [facility] today.  Lab Testing:  **If you had lab testing today and your results are reassuring or normal they will be mailed to you or sent through AMT within 7 days.   **If the lab tests need quick action we will call you with the results.  **If you are having labs done on a different day, please call [phone] to schedule at [facility] or [phone] for other [facility] locations. Labs do not offer walk-in appointments.  The phone number we will call with results is # [phone] (home) . If this is not the best number please call our clinic and change the number.  Medication Refills:  If you need any refills please call your pharmacy and they will contact us.   If you need to  your refill at a new pharmacy, please contact the new pharmacy directly. The new pharmacy will help you get your medications transferred faster.   Scheduling:  If you have any concerns about today's visit or wish to schedule another appointment please call our office during normal business hours [phone] (8-5:00 M-F)  If a referral was made to an [facility] specialty provider and you do not get a call from central scheduling, please refer to directions on your visit summary or call our office during normal business  hours for assistance.   If a Mammogram was ordered for you at the Breast Center call [phone] to schedule or change your appointment.  If you had an XRay/CT/Ultrasound/MRI ordered the number is [phone] to schedule or change your radiology appointment.   [facility] has limited ultrasound appointments available on Wednesdays, if you would like your ultrasound at [facility], please call [phone] to schedule.   Medical Concerns:  If you have urgent medical concerns please call [phone] at any time of the day.    Alva Farrell MD

## 2022-09-23 NOTE — PROGRESS NOTES
Assessment & Plan     Here is the plan from today's visit    1. Perioral dermatitis  Use only when rash is present, if rash gone, can stop using and use for flare ups  - pimecrolimus (ELIDEL) 1 % external cream; Apply topically 2 times daily  Dispense: 30 g; Refill: 11    2. Androgenetic alopecia  Discussed risks of lower libido and erectile dysfunction side effects. Need to reassess progress with hair loss/growth within 1 year  - finasteride (PROPECIA) 1 MG tablet; Take 1 tablet (1 mg) by mouth daily  Dispense: 30 tablet; Refill: 11  ADDENDUM  - pt called stating finasteride not covered by insurance requesting alternative.   - Minoxidil topical prescribed, pt should also know it may not be covered.   - minoxidil (ROGAINE) 5 % external solution; Apply 1ml twice daily. Continuous therapy for 4 months may be necessary for hair growth    Follow up plan  Get COVID vaccine after 11/1/22    I spent a total of 38 minutes on the day of the visit.   Time spent doing chart review, history and exam, documentation and further activities per the note      No follow-ups on file.    Alva Farrell MD  [facility] MISSAEL Osei is a 22 year old, presenting for the following health issues:  New Patient (Patient establishing care today ), Derm Problem (Complains of black spots/pimples on face started a few months ago ), and Hair/Scalp Problem (Flaky scalp/ Dander ongoing for a month. He has tried shampoo (head and shoulders) but no improvement.)      HPI     Skin cracks on face, dry, lines, started 3-4 weeks ago.       Hair  Lots of dandruff starting 6 weeks ago,lossing hair more in the past 2-3 months.   Head and shoulders starting 3 weeks ago and was using it every other day, not helpful.   No big life changes, roommate changes, hair or skin product changes  Wants to be finasteride    Review of Systems   History reviewed. No pertinent past medical history.  Past Surgical History:   Procedure  "Laterality Date     NO HISTORY OF SURGERY       Family History   Problem Relation Age of Onset     No Known Problems Mother      No Known Problems Father      No Known Problems Sister      No Known Problems Brother      Diabetes No family hx of      Rashes/Skin Problems No family hx of      Hypertension No family hx of      Heart Disease No family hx of      Cancer No family hx of      Social History     Socioeconomic History     Marital status: Single   Occupational History     Occupation: pharmacy cashier   Tobacco Use     Smoking status: Never Smoker     Smokeless tobacco: Never Used   Vaping Use     Vaping Use: Former     Start date: 3/1/2022     Quit date: 4/1/2022     Substances: Nicotine   Substance and Sexual Activity     Alcohol use: No     Drug use: No     Sexual activity: Not Currently     Partners: Female   Other Topics Concern     Not on file   Social History Narrative    Updated 9/23/22: Lives with uncle and brother. Works as a  at a [employer].            Objective    /74   Pulse 81   Temp 98.9  F (37.2  C) (Oral)   Resp 16   Ht 1.753 m (5' 9\")   Wt 52.2 kg (115 lb)   SpO2 99%   BMI 16.98 kg/m    Body mass index is 16.98 kg/m .  Physical Exam  Skin:     Comments: Central top-of-head hair thinning. Small amount of white flaking c/s dandruff. Bilateral cheeks: flesh-colored papules scattered, nonerythematous, not fluid-filled                                    "

## 2022-09-23 NOTE — TELEPHONE ENCOUNTER
[facility] phone call message- medication clarification/question:    Full Medication Name:   finasteride (PROPECIA) 1 MG tablet 30 tablet         Question: Patient called and said that his insurance will not cover the medications and wants to know what other options does he have to receive his medications. Patient would like a call back at [phone].     Pharmacy confirmed as [id number] - [place], [place] - [address]  AT [address] [facility]: Yes    OK to leave a message on voice mail? Yes    Primary language: English      needed? No    Call taken on September 23, 2022 at 4:10 PM by Tisha Vann

## 2022-09-26 NOTE — TELEPHONE ENCOUNTER
RN attempted to call pt back but got busy signal. Will send [employer] relaying message per dr andrade below. Please relay if pt calls back    Jaylintutu Brown RN

## 2022-09-26 NOTE — TELEPHONE ENCOUNTER
Minoxidil topical prescribed. Also unlikely to be covered by insurance. I don't know of any other alternatives.   -Alva Farrell MD

## 2022-09-26 NOTE — TELEPHONE ENCOUNTER
Seeking alternate script that insurance can cover, patient call back number is [phone]. Patient just saw Dr. Farrell on Friday so this medication should be managed by us.

## 2022-10-05 ENCOUNTER — OFFICE VISIT (OUTPATIENT)
Dept: FAMILY MEDICINE | Facility: CLINIC | Age: 22
End: 2022-10-05
Payer: COMMERCIAL

## 2022-10-05 VITALS
DIASTOLIC BLOOD PRESSURE: 73 MMHG | TEMPERATURE: 98.1 F | BODY MASS INDEX: 16.72 KG/M2 | RESPIRATION RATE: 15 BRPM | SYSTOLIC BLOOD PRESSURE: 121 MMHG | HEART RATE: 74 BPM | WEIGHT: 116.8 LBS | HEIGHT: 70 IN | OXYGEN SATURATION: 100 %

## 2022-10-05 DIAGNOSIS — L64.9 ANDROGENETIC ALOPECIA: Primary | ICD-10-CM

## 2022-10-05 DIAGNOSIS — L21.0 DANDRUFF: ICD-10-CM

## 2022-10-05 PROCEDURE — 99214 OFFICE O/P EST MOD 30 MIN: CPT | Performed by: STUDENT IN AN ORGANIZED HEALTH CARE EDUCATION/TRAINING PROGRAM

## 2022-10-05 RX ORDER — KETOCONAZOLE 20 MG/ML
SHAMPOO TOPICAL
Qty: 120 ML | Refills: 1 | Status: SHIPPED | OUTPATIENT
Start: 2022-10-06 | End: 2024-01-29

## 2022-10-05 RX ORDER — FINASTERIDE 1 MG/1
1 TABLET, FILM COATED ORAL DAILY
Qty: 30 TABLET | Refills: 11 | Status: SHIPPED | OUTPATIENT
Start: 2022-10-05 | End: 2023-11-07

## 2022-10-05 NOTE — PROGRESS NOTES
"  Assessment & Plan     Androgenetic alopecia  Prescribed before, not covered by insurance. Showed him GoodRx  - finasteride (PROPECIA) 1 MG tablet  Dispense: 30 tablet; Refill: 11    Dandruff  Discussed scalp care, less frequent shampooing   - ketoconazole (NIZORAL) 2 % external shampoo  Dispense: 120 mL; Refill: 1    BMI 17  Concerned about low weight - has always been thin. See AVS - reviewed high fat and high protein, strength training exercise to build muscle.       Prescription drug management  21 minutes spent on the date of the encounter doing chart review, history and exam, documentation and further activities per the note      Return if symptoms worsen or fail to improve.    Johanna DO Joaquina  [facility] MISSAEL Osei is a 22 year old  presenting for the following health issues:  Hair/Scalp Problem (Is getting worse, was prescribed a medication but pharmacy said he was unable to get the medication)      HPI     Dandruff: what to do? Using Head and shoulders. Sometime shampoos twice daily     How to gain weight?     Review of Systems   Pertinent positives and negatives per HPI.        Objective    /73 (BP Location: Right arm, Patient Position: Sitting, Cuff Size: Adult Regular)   Pulse 74   Temp 98.1  F (36.7  C) (Oral)   Resp 15   Ht 1.765 m (5' 9.5\")   Wt 53 kg (116 lb 12.8 oz)   SpO2 100%   BMI 17.00 kg/m    Body mass index is 17 kg/m .  Physical Exam   GENERAL: alert, cooperative, in no acute distress  HEENT: sclera clear, moist mucous membranes. Scalp - few areas of scant flaking, mild scattered erythema but overall mostly normal in apperance   PULM: normal respiratory effort   CV: regular rate, extremities warm and well perfused   NEURO: alert and oriented, grossly intact, moves all extremities, normal gait   SKIN: no rashes or lesions visualized   PSYCH: euthymic affect          "

## 2022-10-05 NOTE — PATIENT INSTRUCTIONS
"  Here are some food suggestions:   You can google or look into high protein and high fat food options to gain weight healthily.     Your exercise should involve some strength training. You don't need weights to do strength training - can be squats, pushups, other exercises. \"Strength training to build muscle\"    +++++++++++++++++++++++++++++    Homemade protein smoothies    Drinking homemade protein smoothies can be a highly nutritious and quick way to gain weight.    Making your own smoothies is the best way since commercial versions are often full of sugar and lack nutrients. It also gives you full control over the flavor and nutrient content.    Here are just a few tasty variations you can try. You can combine each with 2 cups (470 mL) of dairy milk or soy milk if you have lactose intolerance. Both have more nutrients and calories than other alternative milks.    Chocolate banana nut shake: Combine 1 banana, 1 scoop of chocolate whey protein, and 1 tablespoon (15 mL) of peanut or another nut butter.    Vanilla berry shake: Combine 1 cup (237 mL) of fresh or frozen mixed berries, ice, 1 cup (237 mL) of high protein, full fat Greek yogurt, and 1 scoop of vanilla whey protein.    Chocolate hazelnut shake: Combine 15 ounces (444 mL) of chocolate milk with 1 scoop of chocolate whey protein, 1 tablespoon (15 mL) of hazelnut butter, and 1 avocado.    Caramel apple shake: Combine 1 sliced apple, 1 cup (237 mL) of full fat Greek yogurt, 1 scoop of caramel- or vanilla-flavored whey protein, and 1 tablespoon (15 mL) of sugar-free caramel sauce or flavoring.    Vanilla blueberry shake: Combine 1 cup (237 mL) of fresh or frozen blueberries, 1 scoop of vanilla whey protein, 1 cup (237 mL) of vanilla Greek yogurt, and sweetener if needed.    Super green shake: Combine 1 cup (237 mL) of spinach, 1 avocado, 1 banana, 1 cup (237 mL) of pineapple, and 1 scoop of unflavored or vanilla whey protein.    All of these smoothies provide " around 400-600 calories, along with a high amount of protein and other important vitamins and minerals.    +++++++++++++++++++++++++++    Nuts and nut butters are perfect choices if you re looking to gain weight.    Just one small handful of raw almonds (1/4 cup) contains 170 calories, 6 grams of protein, 4 grams of fiber, and 15 grams of healthy fats ([employer]).    Since nuts are very calorie-dense, just two handfuls per day with a meal or as a snack can quickly add hundreds of calories.    You can add nut butters to a variety of snacks or dishes, such as smoothies, yogurts, and crackers, to turn them into a high calorie snack in no time.    For a quick pick-me-up, try this peanut butter banana smoothie, with only three ingredients (270 calories, using whole milk). If you have a peanut allergy, substitute another nut butter.    However, make sure you pick 100 percent nut butters with no added sugar or extra oils. Or better yet, make your own from this homemade almond butter recipe. It s easy to make and easy on your wallet, too.    ++++++++++++++++++++++++++++    Greek yogurt is high in protein. Get the 4% high fat kind so it is high in fat.     +++++++++++++++++++++++++++++++  Protein supplements  Taking protein supplements is a common strategy for athletes and bodybuilders who want to gain weight. There are many types available, including whey, soy, egg, and pea protein.    Whey protein supplements and mass gainers (supplements that can help you gain muscle mass) can be very easy and cost-effective strategies to gain weight, especially when combined with strength training (16).    Some people think whey protein is unhealthy or unnatural, but this isn t the case. Whey protein is made from dairy and has been shown to help improve health markers and reduce the risk of disease (17).    Protein supplements may be even more important if you re also training since your daily protein requirements increase.    Like  meats and other animal products, whey protein contains all the essential amino acids required to stimulate muscle growth.    You can use it before or after your workout and at any other point during the day.    The easiest way to add protein powder into your diet is with a protein smoothie, especially for breakfast. That gives you the rest of the day to add in nutritious meals and snacks to make sure you get a balanced nutrient intake.    Generally, a smoothie that you make yourself will be more nutritious than buying one that s ready-made, which may have added sugar and flavorings.    Try this basic whey shake recipe to start your day off with a high energy breakfast. For even more protein, try adding in peanut butter, almond butter, flaxseeds, or marquise seeds.    Unflavored whey protein can be added to dishes such as soups, mashed potatoes, and oatmeal to increase protein content.

## 2022-10-26 ENCOUNTER — APPOINTMENT (OUTPATIENT)
Dept: MRI IMAGING | Facility: CLINIC | Age: 22
End: 2022-10-26
Attending: EMERGENCY MEDICINE
Payer: COMMERCIAL

## 2022-10-26 ENCOUNTER — HOSPITAL ENCOUNTER (EMERGENCY)
Facility: CLINIC | Age: 22
Discharge: HOME | End: 2022-10-26
Attending: EMERGENCY MEDICINE | Admitting: EMERGENCY MEDICINE
Payer: COMMERCIAL

## 2022-10-26 VITALS
RESPIRATION RATE: 20 BRPM | TEMPERATURE: 98.9 F | HEIGHT: 70 IN | BODY MASS INDEX: 16.76 KG/M2 | HEART RATE: 82 BPM | DIASTOLIC BLOOD PRESSURE: 74 MMHG | SYSTOLIC BLOOD PRESSURE: 117 MMHG | OXYGEN SATURATION: 100 %

## 2022-10-26 DIAGNOSIS — H92.02 OTALGIA, LEFT: ICD-10-CM

## 2022-10-26 LAB
ANION GAP SERPL CALCULATED.3IONS-SCNC: 8 MMOL/L (ref 7–15)
BASOPHILS # BLD AUTO: 0 10E3/UL (ref 0–0.2)
BASOPHILS NFR BLD AUTO: 1 %
BUN SERPL-MCNC: 15.7 MG/DL (ref 6–20)
CALCIUM SERPL-MCNC: 9.5 MG/DL (ref 8.6–10)
CHLORIDE SERPL-SCNC: 101 MMOL/L (ref 98–107)
CREAT SERPL-MCNC: 0.92 MG/DL (ref 0.67–1.17)
DEPRECATED HCO3 PLAS-SCNC: 29 MMOL/L (ref 22–29)
EOSINOPHIL # BLD AUTO: 0.1 10E3/UL (ref 0–0.7)
EOSINOPHIL NFR BLD AUTO: 1 %
ERYTHROCYTE [DISTWIDTH] IN BLOOD BY AUTOMATED COUNT: 13.2 % (ref 10–15)
GFR SERPL CREATININE-BSD FRML MDRD: >90 ML/MIN/1.73M2
GLUCOSE SERPL-MCNC: 88 MG/DL (ref 70–99)
HCT VFR BLD AUTO: 49.4 % (ref 40–53)
HGB BLD-MCNC: 15.8 G/DL (ref 13.3–17.7)
HOLD SPECIMEN: NORMAL
IMM GRANULOCYTES # BLD: 0 10E3/UL
IMM GRANULOCYTES NFR BLD: 0 %
LYMPHOCYTES # BLD AUTO: 2.3 10E3/UL (ref 0.8–5.3)
LYMPHOCYTES NFR BLD AUTO: 49 %
MCH RBC QN AUTO: 27.9 PG (ref 26.5–33)
MCHC RBC AUTO-ENTMCNC: 32 G/DL (ref 31.5–36.5)
MCV RBC AUTO: 87 FL (ref 78–100)
MONOCYTES # BLD AUTO: 0.3 10E3/UL (ref 0–1.3)
MONOCYTES NFR BLD AUTO: 7 %
NEUTROPHILS # BLD AUTO: 2 10E3/UL (ref 1.6–8.3)
NEUTROPHILS NFR BLD AUTO: 42 %
NRBC # BLD AUTO: 0 10E3/UL
NRBC BLD AUTO-RTO: 0 /100
PLATELET # BLD AUTO: 275 10E3/UL (ref 150–450)
POTASSIUM SERPL-SCNC: 4.1 MMOL/L (ref 3.4–5.3)
RBC # BLD AUTO: 5.67 10E6/UL (ref 4.4–5.9)
SODIUM SERPL-SCNC: 138 MMOL/L (ref 136–145)
WBC # BLD AUTO: 4.8 10E3/UL (ref 4–11)

## 2022-10-26 PROCEDURE — 36415 COLL VENOUS BLD VENIPUNCTURE: CPT | Performed by: EMERGENCY MEDICINE

## 2022-10-26 PROCEDURE — A9585 GADOBUTROL INJECTION: HCPCS | Performed by: EMERGENCY MEDICINE

## 2022-10-26 PROCEDURE — 80048 BASIC METABOLIC PNL TOTAL CA: CPT | Performed by: EMERGENCY MEDICINE

## 2022-10-26 PROCEDURE — 70553 MRI BRAIN STEM W/O & W/DYE: CPT

## 2022-10-26 PROCEDURE — 70549 MR ANGIOGRAPH NECK W/O&W/DYE: CPT

## 2022-10-26 PROCEDURE — 85025 COMPLETE CBC W/AUTO DIFF WBC: CPT | Performed by: EMERGENCY MEDICINE

## 2022-10-26 PROCEDURE — 99285 EMERGENCY DEPT VISIT HI MDM: CPT | Mod: 25

## 2022-10-26 PROCEDURE — 70544 MR ANGIOGRAPHY HEAD W/O DYE: CPT

## 2022-10-26 PROCEDURE — 255N000002 HC RX 255 OP 636: Performed by: EMERGENCY MEDICINE

## 2022-10-26 RX ORDER — CIPROFLOXACIN AND DEXAMETHASONE 3; 1 MG/ML; MG/ML
4 SUSPENSION/ DROPS AURICULAR (OTIC) 2 TIMES DAILY
Qty: 2.8 ML | Refills: 0 | Status: SHIPPED | OUTPATIENT
Start: 2022-10-26 | End: 2022-11-02

## 2022-10-26 RX ORDER — GADOBUTROL 604.72 MG/ML
10 INJECTION INTRAVENOUS ONCE
Status: COMPLETED | OUTPATIENT
Start: 2022-10-26 | End: 2022-10-26

## 2022-10-26 RX ADMIN — GADOBUTROL 10 ML: 604.72 INJECTION INTRAVENOUS at 09:20

## 2022-10-26 ASSESSMENT — ENCOUNTER SYMPTOMS
RHINORRHEA: 0
NUMBNESS: 1
FEVER: 0
CHILLS: 0
COUGH: 0

## 2022-10-26 ASSESSMENT — ACTIVITIES OF DAILY LIVING (ADL): ADLS_ACUITY_SCORE: 35

## 2022-10-26 NOTE — DISCHARGE INSTRUCTIONS
Please begin ear drop for possible external ear infection    You may follow-up with [facility] to discuss your symptoms.  Your pain may stem from irritation to your facial nerves as well.    Return if you develop severe pain, vision changes, facial droop, or any other new or troubling symptoms.

## 2022-10-26 NOTE — ED PROVIDER NOTES
"  History   Chief Complaint:  Otalgia       The history is provided by the patient.      Sea Ash is an otherwise healthy 22 year old male who presents with otalgia. The patient reports intermittent left ear pain for the past two months. He states that he decided to come to the ED because the pain prevented him from sleeping. He mentions that he has decreased hearing of high pitched sounds in the left ear. He also endorses some numbness on the left side of his face, though this is intermittent and localized to the region just in front of his ear. He denies any ear drainage stating it just feels blocked up. He denies putting anything in his ears. He further denies any double vision, recent head injury/concussion, fever, chills, rhinorrhea, or congestion.     Review of Systems   Constitutional: Negative for chills and fever.   HENT: Positive for ear pain (left) and hearing loss. Negative for congestion, ear discharge and rhinorrhea.    Eyes: Negative for visual disturbance.   Respiratory: Negative for cough.    Neurological: Positive for numbness.   All other systems reviewed and are negative.      Allergies:  The patient has no known allergies.     Medications:  Finasteride     Past Medical History:     Wrist fracture, right  Gynecomastia  Urethral stricture    Social History:  The patient presents to the ED unaccompanied  PCP: Meliza Rainey     Physical Exam     Patient Vitals for the past 24 hrs:   BP Temp Pulse Resp SpO2 Height   10/26/22 0532 117/74 98.9  F (37.2  C) 82 20 100 % 1.778 m (5' 10\")       Physical Exam  General:              Well-nourished              Speaking in full sentences  Eyes:              Conjunctiva without injection or scleral icterus              PERRL              EOM full w/out entrapment or proptosis              Subtle left eye ptosis              No miosis  ENT:              Moist mucous membranes              Posterior oropharynx clear without erythema or exudate              " No tonsillar hypertrophy, exudate, asymmetry, nor uvular deviation              No oral lesions              Bilateral TM translucent and gray without air/fluid level or overlying erythema, bony landmarks visualized.              Mild discomfort with manipulation of left pinnae and palpation over tragus, and possible minimal external auditory canal swelling              Nares patent              Pinnae normal              No midface swelling, erythema, or asymmetry  Neck:              Full ROM              No stiffness appreciated              No tenderness to palpation over left carotid artery distribution              No palpable pulsatile mass  Resp:              Lungs CTAB              No crackles, wheezing or audible rubs              Good air movement  CV:                    Normal rate, regular rhythm              S1 and S2 present              No murmur, gallop or rub  GI:              BS present              Abdomen soft without distention              Non-tender to light and deep palpation              No guarding or rebound tenderness  Skin:              Warm, dry, well perfused              No rashes or open wounds on exposed skin  MSK:              Moves all extremities              No focal deformities or swelling  Neuro:              Alert              CN III-XII intact other than left subtle ptosis              5/5  strength              Answers questions appropriately              Moves all extremities equally              Gait stable  Psych:              Normal affect, normal mood       Emergency Department Course     Imaging:  MRA Brain ([facility] [place]) wo Contrast   Final Result   IMPRESSION:    Normal MR angiography of the brain.      KEITH BONNER MD            SYSTEM ID:  [id number]      MRA Neck (Carot[place]) wo & w Contrast   Final Result   IMPRESSION:    Normal MR angiography of the neck.      KEITH BONNER MD            SYSTEM ID:  [id number]      MR Brain w/o & w Contrast    Final Result   IMPRESSION:    Normal brain MRI. No acute intracranial finding. No evidence for   recent ischemia, intracranial hemorrhage, or mass.      KEITH BONNER MD            SYSTEM ID:  [id number]        Report per radiology    Laboratory:  Labs Ordered and Resulted from Time of ED Arrival to Time of ED Departure   BASIC METABOLIC PANEL - Normal       Result Value    Sodium 138      Potassium 4.1      Chloride 101      Carbon Dioxide (CO2) 29      Anion Gap 8      Urea Nitrogen 15.7      Creatinine 0.92      Calcium 9.5      Glucose 88      GFR Estimate >90     CBC WITH PLATELETS AND DIFFERENTIAL    WBC Count 4.8      RBC Count 5.67      Hemoglobin 15.8      Hematocrit 49.4      MCV 87      MCH 27.9      MCHC 32.0      RDW 13.2      Platelet Count 275      % Neutrophils 42      % Lymphocytes 49      % Monocytes 7      % Eosinophils 1      % Basophils 1      % Immature Granulocytes 0      NRBCs per 100 WBC 0      Absolute Neutrophils 2.0      Absolute Lymphocytes 2.3      Absolute Monocytes 0.3      Absolute Eosinophils 0.1      Absolute Basophils 0.0      Absolute Immature Granulocytes 0.0      Absolute NRBCs 0.0        Emergency Department Course:           Reviewed:  I reviewed nursing notes, vitals, past medical history and Care Everywhere    Assessments:  0758 I obtained history and examined the patient as noted above.   1035 I rechecked the patient and explained findings.     Disposition:  The patient was discharged to home.     Impression & Plan     Medical Decision Making:  Sea Ash is a 22-year-old male presenting to the ED for evaluation of otalgia.  VS on presentation unremarkable.  Exam as noted above, revealing mild ptosis of left eye.  DDx is broad including not limited to, otitis media, otitis externa, mastoiditis, trigeminal neuralgia, vascular dissection, CVA, mass, facial nerve palsy, among others.  Given findings of ptosis, subjective feeling of numbness to the left side of his  preauricular area, I did feel that advanced imaging was indicated to evaluate for CNS pathology or vascular etiology (dissection).  Fortunately, MRI/MRA returned unremarkable, specifically without evidence of dissection, ischemia, or aneurysmal disease.  On exam he has no evidence of otitis media.  He does have mild tenderness with manipulation of the pinnae and the palpation over the tragus, for which otitis externa is on the differential.  He will be treated empirically with Ciprodex to see if this alleviates symptoms.  Peripheral neuropathy such as trigeminal neuralgia on the differential as well.  No clear sensory discrepancy in clinical exam, no evidence of facial nerve palsy.  Remainder of neurologic exam unremarkable.  CBC and BMP unremarkable.  With reasonable clinical certainty I feel patient can safely be discharged from the ED with close neurology follow-up.  He will be again treated with Ciprodex for possible otitis externa, and otherwise encouraged to follow-up as directed.  He is to return to the ED should he develop any new or troubling symptoms such as visual changes, facial palsy, or any other new neurologic symptoms.  Patient feels comfortable with this plan of care and all questions answered prior to discharge.     Diagnosis:    ICD-10-CM    1. Otalgia, left  H92.02           Discharge Medications:  Discharge Medication List as of 10/26/2022 10:45 AM      START taking these medications    Details   ciprofloxacin-dexamethasone (CIPRODEX) 0.3-0.1 % otic suspension Place 4 drops Into the left ear 2 times daily for 7 days, Disp-2.8 mL, R-0, E-Prescribe             Scribe Disclosure:  KRISTIN, Britney Martínez, am serving as a scribe at 7:55 AM on 10/26/2022 to document services personally performed by Steven Crowder MD\ based on my observations and the provider's statements to me.            Steven Crowder MD  10/26/22 1122

## 2022-10-26 NOTE — ED TRIAGE NOTES
C/o left ear pain for 2 months. Pain is getting worse       Triage Assessment     Row Name 10/26/22 0531       Triage Assessment (Adult)    Airway WDL WDL

## 2022-11-19 ENCOUNTER — HEALTH MAINTENANCE LETTER (OUTPATIENT)
Age: 22
End: 2022-11-19

## 2022-12-06 ENCOUNTER — OFFICE VISIT (OUTPATIENT)
Dept: FAMILY MEDICINE | Facility: CLINIC | Age: 22
End: 2022-12-06
Payer: COMMERCIAL

## 2022-12-06 VITALS
OXYGEN SATURATION: 98 % | TEMPERATURE: 98.5 F | BODY MASS INDEX: 18.84 KG/M2 | DIASTOLIC BLOOD PRESSURE: 70 MMHG | SYSTOLIC BLOOD PRESSURE: 107 MMHG | HEART RATE: 79 BPM | WEIGHT: 127.2 LBS | HEIGHT: 69 IN

## 2022-12-06 DIAGNOSIS — H91.92: ICD-10-CM

## 2022-12-06 DIAGNOSIS — Z11.3 SCREEN FOR STD (SEXUALLY TRANSMITTED DISEASE): ICD-10-CM

## 2022-12-06 DIAGNOSIS — H53.8 BLURRY VISION: ICD-10-CM

## 2022-12-06 DIAGNOSIS — H92.02 LEFT EAR PAIN: ICD-10-CM

## 2022-12-06 DIAGNOSIS — Z00.00 ROUTINE GENERAL MEDICAL EXAMINATION AT A HEALTH CARE FACILITY: Primary | ICD-10-CM

## 2022-12-06 PROCEDURE — 87340 HEPATITIS B SURFACE AG IA: CPT | Performed by: STUDENT IN AN ORGANIZED HEALTH CARE EDUCATION/TRAINING PROGRAM

## 2022-12-06 PROCEDURE — 86803 HEPATITIS C AB TEST: CPT | Performed by: STUDENT IN AN ORGANIZED HEALTH CARE EDUCATION/TRAINING PROGRAM

## 2022-12-06 PROCEDURE — 36415 COLL VENOUS BLD VENIPUNCTURE: CPT | Performed by: STUDENT IN AN ORGANIZED HEALTH CARE EDUCATION/TRAINING PROGRAM

## 2022-12-06 PROCEDURE — 87389 HIV-1 AG W/HIV-1&-2 AB AG IA: CPT | Performed by: STUDENT IN AN ORGANIZED HEALTH CARE EDUCATION/TRAINING PROGRAM

## 2022-12-06 PROCEDURE — 86780 TREPONEMA PALLIDUM: CPT | Performed by: STUDENT IN AN ORGANIZED HEALTH CARE EDUCATION/TRAINING PROGRAM

## 2022-12-06 PROCEDURE — 86706 HEP B SURFACE ANTIBODY: CPT | Performed by: STUDENT IN AN ORGANIZED HEALTH CARE EDUCATION/TRAINING PROGRAM

## 2022-12-06 PROCEDURE — 99395 PREV VISIT EST AGE 18-39: CPT | Mod: GC | Performed by: STUDENT IN AN ORGANIZED HEALTH CARE EDUCATION/TRAINING PROGRAM

## 2022-12-06 ASSESSMENT — ENCOUNTER SYMPTOMS
HEARTBURN: 0
DYSURIA: 0
ARTHRALGIAS: 0
EYE PAIN: 1
PARESTHESIAS: 0
MYALGIAS: 0
DIARRHEA: 0
NAUSEA: 0
PALPITATIONS: 0
ABDOMINAL PAIN: 0
NERVOUS/ANXIOUS: 0
HEMATOCHEZIA: 0
FEVER: 0
HEADACHES: 0
SORE THROAT: 0
FREQUENCY: 0
CONSTIPATION: 0
SHORTNESS OF BREATH: 0
DIZZINESS: 0
HEMATURIA: 0
WEAKNESS: 0
CHILLS: 0
COUGH: 0
JOINT SWELLING: 0

## 2022-12-06 NOTE — PROGRESS NOTES
Preceptor Attestation:   Patient seen, evaluated and discussed with the resident. I have verified the content of the note, which accurately reflects my assessment of the patient and the plan of care.   Supervising Physician:  Shara Jose MD

## 2022-12-06 NOTE — PATIENT INSTRUCTIONS
Patient Education   Here is the plan from today's visit    1. Routine general medical examination at a health care facility    - Neisseria gonorrhoeae PCR; Future  - Chlamydia trachomatis PCR; Future  - Treponema Abs w Reflex to RPR and Titer; Future  - HIV Antigen Antibody Combo; Future  - Hepatitis B Surface Antibody; Future  - Hepatitis B surface antigen; Future  - Hepatitis C antibody; Future  - Adult Eye  Referral; Future    2. Screen for STD (sexually transmitted disease)    - Neisseria gonorrhoeae PCR; Future  - Chlamydia trachomatis PCR; Future  - Treponema Abs w Reflex to RPR and Titer; Future  - HIV Antigen Antibody Combo; Future  - Hepatitis B Surface Antibody; Future  - Hepatitis B surface antigen; Future  - Hepatitis C antibody; Future    3. Disturbed hearing, left    - Adult Audiology  Referral; Future    4. Blurry vision      5. Left ear pain    - aspirin-acetaminophen-caffeine (EXCEDRIN MIGRAINE) 250-250-65 MG tablet; Take 1 tablet by mouth daily as needed for headaches  Dispense: 60 tablet; Refill: 0        Please call or return to clinic if your symptoms don't go away.    Follow up plan  No follow-ups on file.    Thank you for coming to [facility] today.  Lab Testing:  **If you had lab testing today and your results are reassuring or normal they will be mailed to you or sent through [employer] within 7 days.   **If the lab tests need quick action we will call you with the results.  **If you are having labs done on a different day, please call [phone] to schedule at [facility] or [phone] for other [facility] locations. Labs do not offer walk-in appointments.  The phone number we will call with results is # [phone] (home) . If this is not the best number please call our clinic and change the number.  Medication Refills:  If you need any refills please call your pharmacy and they will contact us.   If you need to  your refill at a new  pharmacy, please contact the new pharmacy directly. The new pharmacy will help you get your medications transferred faster.   Scheduling:  If you have any concerns about today's visit or wish to schedule another appointment please call our office during normal business hours [phone] (8-5:00 M-F). If you can no longer make a scheduled visit, please cancel via [facility] or call us to cancel.   If a referral was made to an [facility] specialty provider and you do not get a call from central scheduling, please refer to directions on your visit summary or call our office during normal business hours for assistance.   If a Mammogram was ordered for you at the [facility] Center call [phone] to schedule or change your appointment.  If you had an XRay/CT/Ultrasound/MRI ordered the number is [phone] to schedule or change your radiology appointment.   [facility] has limited ultrasound appointments available on Wednesdays, if you would like your ultrasound at [facility], please call [phone] to schedule.   Medical Concerns:  If you have urgent medical concerns please call [phone] at any time of the day.    Ace Hill, DO

## 2022-12-06 NOTE — PROGRESS NOTES
SUBJECTIVE:   CC: Sea is an 22 year old who presents for preventative health visit.     Patient has been advised of split billing requirements and indicates understanding: Yes  Healthy Habits:     Getting at least 3 servings of Calcium per day:  Yes    Bi-annual eye exam:  Yes    Dental care twice a year:  Yes    Sleep apnea or symptoms of sleep apnea:  None    Diet:  Regular (no restrictions)    Frequency of exercise:  2-3 days/week    Duration of exercise:  Less than 15 minutes    Taking medications regularly:  Yes    Medication side effects:  Not applicable    PHQ-2 Total Score: 0    Additional concerns today:  No    Annual    Education - Getting Bachelor's in Marketing, 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, finishing up next year    Home life - Lives with brother 25 - his parents are back home in [place] - taking care of family - he is heading down in May to help as well     Nutrition - knows how to cook, mainly traditional (lots of vegetables; fish/chicken as protein)    Physical Activity - 2-3x week workout (weight-lifting)    Substance - no EtOH / no Nicotine / no Tobacco / sporadically marijuana - socially / event    Sexual - Active, uses protection - would like STD testing     Recreational - reading / video games / weekly Gnosticist / regular quran reading     Ear Pain / Sensation    Been going on for the last couple months    Intermittently notes a twing ear pain that revolves around the mastoid - mainly left side - sometimes stretches towards his right eye    No TMJ symptoms    Notes some tinnitus/ringing during these episodes? Some hearing loss?    Glasses    Lost his glasses    Does want a new appointment with optometry if possible       Today's PHQ-2 Score:   PHQ-2 ( 1999 [employer]) 12/6/2022   Q1: Little interest or pleasure in doing things 0   Q2: Feeling down, depressed or hopeless 0   PHQ-2 Score 0   Q1: Little interest or pleasure in doing things Not at all   Q2: Feeling down, depressed or hopeless Not at  all   PHQ-2 Score 0       Have you ever done Advance Care Planning? (For example, a Health Directive, POLST, or a discussion with a medical provider or your loved ones about your wishes): No, advance care planning information given to patient to review.  Patient declined advance care planning discussion at this time.    Social History     Tobacco Use     Smoking status: Never     Smokeless tobacco: Never   Substance Use Topics     Alcohol use: No     If you drink alcohol do you typically have >3 drinks per day or >7 drinks per week? Not applicable    Alcohol Use 12/6/2022   Prescreen: >3 drinks/day or >7 drinks/week? Not Applicable   Prescreen: >3 drinks/day or >7 drinks/week? -   No flowsheet data found.    Last PSA: No results found for: PSA    Reviewed orders with patient. Reviewed health maintenance and updated orders accordingly - Yes      Reviewed and updated as needed this visit by clinical staff    Allergies  Meds              Reviewed and updated as needed this visit by Provider                 No past medical history on file.   Past Surgical History:   Procedure Laterality Date     NO HISTORY OF SURGERY         Review of Systems   Constitutional: Negative for chills and fever.   HENT: Positive for ear pain and hearing loss. Negative for congestion and sore throat.    Eyes: Positive for pain and visual disturbance.   Respiratory: Negative for cough and shortness of breath.    Cardiovascular: Negative for chest pain, palpitations and peripheral edema.   Gastrointestinal: Negative for abdominal pain, constipation, diarrhea, heartburn, hematochezia and nausea.   Genitourinary: Negative for dysuria, frequency, genital sores, hematuria and urgency.   Musculoskeletal: Negative for arthralgias, joint swelling and myalgias.   Skin: Negative for rash.   Neurological: Negative for dizziness, weakness, headaches and paresthesias.   Psychiatric/Behavioral: Negative for mood changes. The patient is not  "nervous/anxious.          OBJECTIVE:   /70   Pulse 79   Temp 98.5  F (36.9  C) (Oral)   Ht 1.76 m (5' 9.29\")   Wt 57.7 kg (127 lb 3.2 oz)   SpO2 98%   BMI 18.63 kg/m      Physical Exam  GENERAL: healthy, alert and no distress  NECK: no adenopathy, no asymmetry, masses, or scars and thyroid normal to palpation  RESP: lungs clear to auscultation - no rales, rhonchi or wheezes  CV: regular rate and rhythm, normal S1 S2, no S3 or S4, no murmur, click or rub, no peripheral edema and peripheral pulses strong  ABDOMEN: soft, nontender, no hepatosplenomegaly, no masses and bowel sounds normal  MS: no gross musculoskeletal defects noted, no edema    Diagnostic Test Results:  Labs reviewed in Epic    ASSESSMENT/PLAN:       ICD-10-CM    1. Routine general medical examination at a health care facility  Z00.00 Neisseria gonorrhoeae PCR     Chlamydia trachomatis PCR     Treponema Abs w Reflex to RPR and Titer     HIV Antigen Antibody Combo     Hepatitis B Surface Antibody     Hepatitis B surface antigen     Hepatitis C antibody     Adult Eye  Referral     Treponema Abs w Reflex to RPR and Titer     HIV Antigen Antibody Combo     Hepatitis B Surface Antibody     Hepatitis B surface antigen     Hepatitis C antibody      2. Screen for STD (sexually transmitted disease)  Z11.3 Neisseria gonorrhoeae PCR     Chlamydia trachomatis PCR     Treponema Abs w Reflex to RPR and Titer     HIV Antigen Antibody Combo     Hepatitis B Surface Antibody     Hepatitis B surface antigen     Hepatitis C antibody     Treponema Abs w Reflex to RPR and Titer     HIV Antigen Antibody Combo     Hepatitis B Surface Antibody     Hepatitis B surface antigen     Hepatitis C antibody      3. Disturbed hearing, left  H91.92 Adult Audiology  Referral      4. Blurry vision  H53.8       5. Left ear pain  H92.02 aspirin-acetaminophen-caffeine (EXCEDRIN MIGRAINE) 250-250-65 MG tablet          Obtain STI testing    Referral placed for " optometry so he can reconnect with them    Referral placed for audiology for a formal hearing screen      Patient has been advised of split billing requirements and indicates understanding: Yes      COUNSELING:   Reviewed preventive health counseling, as reflected in patient instructions        He reports that he has never smoked. He has never used smokeless tobacco.            Ace Hill DO  [facility]

## 2022-12-07 LAB
HBV SURFACE AB SERPL IA-ACNC: 0.09 M[IU]/ML
HBV SURFACE AB SERPL IA-ACNC: NONREACTIVE M[IU]/ML
HBV SURFACE AG SERPL QL IA: NONREACTIVE
HCV AB SERPL QL IA: NONREACTIVE
HIV 1+2 AB+HIV1 P24 AG SERPL QL IA: NONREACTIVE
T PALLIDUM AB SER QL: NONREACTIVE

## 2023-03-12 NOTE — PROGRESS NOTES
Here is the plan from today's visit    Urination pain with history of urethral instrumentation concerning for possible urethral stricture  Back Pain  Scrotal Pain  given patient's pain presentation with urinary pain, back pain, bladder pain, difficulty urination, and history of urethral instrumentation with potential history of urethral stricture per patient report likely that patient's presentation today represents worsening of baseline urethral stricture. Possible that previous asymptomatic STI contributed to development of urethral stricture, thus reasonable to test for possible STI. However, differential does still include epididymitis (although less likely without clear pain on exam), UTI (to be ruled out with urinalysis) and testicular mass (to be ruled out with testicular ultrasound). Ultimately, definitive treatment for urethral stricture involves treatment by urologist. Thus, urology referral placed    Back pain possibly representing irritation from bladder overflow versus urinary tract infection. Thus, urinalysis and. However, patient without any red flag back symptoms necessitating further back pain workup.    - Chlamydia trachomatis/Neisseria gonorrhoeae by PCR - Clinic Collect  - HIV Antigen Antibody Combo; Future  - Treponema Abs w Reflex to RPR and Titer; Future  - UA Macro with Reflex to Micro and Culture - lab collect; Future  - HIV Antigen Antibody Combo  - Treponema Abs w Reflex to RPR and Titer  - UA Macro with Reflex to Micro and Culture - lab collect  - US Testicular & Scrotum w Doppler Ltd; Future  - Adult Urology  Referral; Future    Subjective   Sea is a 22 year old, presenting for the following health issues:  Pain (In lower penis, unable to urinate, inflammation, In testicles/Has been a couple of months/) and Back Pain (When back pain is triggered, causes pains to groin )      HPI   Patient presents for concerns of urination difficulty and back pain.    Went to urology in June  "told that \"urine space\" too small had issues with urination. No groin pain or with low back pain. Told needs surgery to open up  Things thinks maybe was \"cytoscopy\"  Was fine for past few months  Lately more intense groin pain  Now lots of back pain  Can't even touch groin (for one month, has pain and lumps in between)  Lots of swelling  Had microscopic hematuria has not seen any blood  When strains to have bowel movement will urinate  Weak urinary stream \"gets stuck\"  Has pain - has pain when starting feels stomach is \"bloated\" when about to leave has pain so has it in three different times      Regarding urinary concerns:   Patient states.  Patient has no change  urine color or freq urinates 3-4x per day  endorses  smell, frequency and has urinary burning .    Regarding sexual history:  Not currently   In the past - 5  Months. Has not been able to be sexually active recently because of symptoms of pain.  No issues STIs aware  Has some mucus from rectum, when tries to push the pee this comes out     Patient denies symptoms of penile discharge. Patient notes difference in size of his testicles with his left testicle being smaller than the right testicle and thinks potentially some lumps or bumps, but unclear given is not want to palpate test used to much due to concerns for potentiating pain. pt stating feels left testicular size has shrunk, whereas right testes is more normal    Regarding back pain:  Patient states back pain is \"on the sides\" feels like \"sharp knife\" or \"carryign heavy weight on low back\"   Onset = two months, started with the groin pain  Provocation = unclear  Pain better with sitting/pain killers (tylenol or advil)  Quality = 6-7/10 when present  Radiation = no radiation  Severity =   Timing = sporadic, lasting 20minutes abhi flair at certain times, but not always clear what is provoking these pain episodes.  Wants to know if testicular     Patient denies fever, night sweats, immunosuppression, " "HIV, anticoagulant use, prior malignancy, unintentional weight loss, new acute onset of back pain at age >60 years old, nocturnal pain, and pain unchanged despite position. Patient does not have focal spinal tenderness to palpation on exam.         Review of Systems   as in HPI      Objective    /68   Pulse 71   Temp 98.6  F (37  C) (Oral)   Resp 16   Ht 1.753 m (5' 9\")   Wt 59.2 kg (130 lb 9.6 oz)   BMI 19.29 kg/m    Body mass index is 19.29 kg/m .  Physical Exam   GENERAL: healthy, alert and no distress  NECK: no adenopathy, no asymmetry, masses, or scars and thyroid normal to palpation  RESP: lungs clear to auscultation - no rales, rhonchi or wheezes  CV: regular rate and rhythm, normal S1 S2, no S3 or S4, no murmur, click or rub, no peripheral edema and peripheral pulses strong  ABDOMEN: soft, nontender, no suprapubic tenderness, no hepatosplenomegaly, no masses and bowel sounds normal   (male): testicles normal without obvious atrophy or masses (noticeable size discordance in testes R>L), no testicular mass , no epididymal enlargement , no hydrocele present , no varicocele, bilaterally descended testes without pain to palpation, penis normal without urethral discharge and tenderness to palpation   MS: no gross musculoskeletal defects noted, no edema. No pain to palpation along paraspinal area, no bony step offs. No CVA tenderness            "

## 2023-03-13 ENCOUNTER — ANCILLARY PROCEDURE (OUTPATIENT)
Dept: ULTRASOUND IMAGING | Facility: CLINIC | Age: 23
End: 2023-03-13
Payer: COMMERCIAL

## 2023-03-13 ENCOUNTER — OFFICE VISIT (OUTPATIENT)
Dept: FAMILY MEDICINE | Facility: CLINIC | Age: 23
End: 2023-03-13
Payer: COMMERCIAL

## 2023-03-13 VITALS
HEIGHT: 69 IN | WEIGHT: 130.6 LBS | HEART RATE: 71 BPM | SYSTOLIC BLOOD PRESSURE: 107 MMHG | TEMPERATURE: 98.6 F | RESPIRATION RATE: 16 BRPM | BODY MASS INDEX: 19.34 KG/M2 | DIASTOLIC BLOOD PRESSURE: 68 MMHG

## 2023-03-13 DIAGNOSIS — Z11.3 SCREEN FOR STD (SEXUALLY TRANSMITTED DISEASE): ICD-10-CM

## 2023-03-13 DIAGNOSIS — N50.82 SCROTAL PAIN: ICD-10-CM

## 2023-03-13 DIAGNOSIS — N45.1 EPIDIDYMITIS: ICD-10-CM

## 2023-03-13 DIAGNOSIS — R30.9 URINATION PAIN: Primary | ICD-10-CM

## 2023-03-13 LAB
ALBUMIN UR-MCNC: NEGATIVE MG/DL
APPEARANCE UR: CLEAR
BILIRUB UR QL STRIP: NEGATIVE
COLOR UR AUTO: YELLOW
GLUCOSE UR STRIP-MCNC: NEGATIVE MG/DL
HGB UR QL STRIP: NEGATIVE
KETONES UR STRIP-MCNC: NEGATIVE MG/DL
LEUKOCYTE ESTERASE UR QL STRIP: NEGATIVE
NITRATE UR QL: NEGATIVE
PH UR STRIP: 6 [PH] (ref 5–8)
SP GR UR STRIP: >=1.03 (ref 1–1.03)
UROBILINOGEN UR STRIP-ACNC: 0.2 E.U./DL

## 2023-03-13 PROCEDURE — 87491 CHLMYD TRACH DNA AMP PROBE: CPT

## 2023-03-13 PROCEDURE — 36415 COLL VENOUS BLD VENIPUNCTURE: CPT

## 2023-03-13 PROCEDURE — 99214 OFFICE O/P EST MOD 30 MIN: CPT | Mod: GC

## 2023-03-13 PROCEDURE — 76870 US EXAM SCROTUM: CPT | Mod: GC | Performed by: RADIOLOGY

## 2023-03-13 PROCEDURE — 86780 TREPONEMA PALLIDUM: CPT

## 2023-03-13 PROCEDURE — 81003 URINALYSIS AUTO W/O SCOPE: CPT

## 2023-03-13 PROCEDURE — 87389 HIV-1 AG W/HIV-1&-2 AB AG IA: CPT

## 2023-03-13 PROCEDURE — 87591 N.GONORRHOEAE DNA AMP PROB: CPT

## 2023-03-13 ASSESSMENT — PAIN SCALES - GENERAL: PAINLEVEL: SEVERE PAIN (7)

## 2023-03-13 NOTE — PATIENT INSTRUCTIONS
Patient Education   Here is the plan from today's visit    1. Screen for STD (sexually transmitted disease)  - Chlamydia trachomatis/Neisseria gonorrhoeae by PCR - Clinic Collect  - HIV Antigen Antibody Combo; Future  - Treponema Abs w Reflex to RPR and Titer; Future  - UA Macro with Reflex to Micro and Culture - lab collect; Future  - HIV Antigen Antibody Combo  - Treponema Abs w Reflex to RPR and Titer  - UA Macro with Reflex to Micro and Culture - lab collect    2. Epididymitis    - US Testicular & Scrotum w Doppler Ltd; Future    3. Urination pain    - Adult Urology  Referral; Future      Please call or return to clinic if your symptoms don't go away.    Follow up plan  No follow-ups on file.    Thank you for coming to [facility] today.  Lab Testing:  **If you had lab testing today and your results are reassuring or normal they will be mailed to you or sent through [facility] within 7 days.   **If the lab tests need quick action we will call you with the results.  **If you are having labs done on a different day, please call [phone] to schedule at [facility] or [phone] for other [facility] Outpatient Lab locations. Labs do not offer walk-in appointments.  The phone number we will call with results is # [phone] (home) . If this is not the best number please call our clinic and change the number.  Medication Refills:  If you need any refills please call your pharmacy and they will contact us.   If you need to  your refill at a new pharmacy, please contact the new pharmacy directly. The new pharmacy will help you get your medications transferred faster.   Scheduling:  If you have any concerns about today's visit or wish to schedule another appointment please call our office during normal business hours [phone] (8-5:00 M-F). If you can no longer make a scheduled visit, please cancel via [facility] or call us to cancel.   If a referral was made to an [facility]  specialty provider and you do not get a call from central scheduling, please refer to directions on your visit summary or call our office during normal business hours for assistance.   If a Mammogram was ordered for you at the Breast Center call [phone] to schedule or change your appointment.  If you had an XRay/CT/Ultrasound/MRI ordered the number is [phone] to schedule or change your radiology appointment.   [facility] has limited ultrasound appointments available on Wednesdays, if you would like your ultrasound at [facility], please call [phone] to schedule.   Medical Concerns:  If you have urgent medical concerns please call [phone] at any time of the day.    Liz Begum MD

## 2023-03-13 NOTE — Clinical Note
FYI - nothing special to do with his ultrasound result unless it comes back showing epidymitis which would be levoflox 500qd 10days. If its a mass he will need to see urology and I already referred there

## 2023-03-14 LAB
C TRACH DNA SPEC QL PROBE+SIG AMP: NEGATIVE
HIV 1+2 AB+HIV1 P24 AG SERPL QL IA: NONREACTIVE
N GONORRHOEA DNA SPEC QL NAA+PROBE: NEGATIVE
T PALLIDUM AB SER QL: NONREACTIVE

## 2023-03-14 NOTE — PROGRESS NOTES
Preceptor Attestation:   Patient seen, evaluated and discussed with the resident. I have verified the content of the note, which accurately reflects my assessment of the patient and the plan of care.   Supervising Physician:  Qiana Mcfarland, DO

## 2023-04-11 ENCOUNTER — VIRTUAL VISIT (OUTPATIENT)
Dept: UROLOGY | Facility: CLINIC | Age: 23
End: 2023-04-11
Attending: FAMILY MEDICINE
Payer: COMMERCIAL

## 2023-04-11 DIAGNOSIS — Z91.199 FAILURE TO ATTEND APPOINTMENT: Primary | ICD-10-CM

## 2023-04-11 DIAGNOSIS — R30.9 URINATION PAIN: ICD-10-CM

## 2023-04-11 NOTE — LETTER
4/11/2023       RE: Sea Ash  [address] [address]     Dear Colleague,    Thank you for referring your patient, Sea Ash, to the [facility] at [facility]. Please see a copy of my visit note below.    No Show      Again, thank you for allowing me to participate in the care of your patient.      Sincerely,    Meredith Serna PA-C, FABIOLA

## 2023-04-25 NOTE — PROGRESS NOTES
No Show   Include Z78.9 (Other Specified Conditions Influencing Health Status) As An Associated Diagnosis?: Yes

## 2023-10-09 ENCOUNTER — APPOINTMENT (OUTPATIENT)
Dept: OPTOMETRY | Facility: CLINIC | Age: 23
End: 2023-10-09
Payer: COMMERCIAL

## 2023-10-09 PROCEDURE — 92340 FIT SPECTACLES MONOFOCAL: CPT | Performed by: OPTOMETRIST

## 2023-11-06 DIAGNOSIS — L64.9 ANDROGENETIC ALOPECIA: ICD-10-CM

## 2023-11-07 RX ORDER — FINASTERIDE 1 MG/1
1 TABLET, FILM COATED ORAL DAILY
Qty: 30 TABLET | Refills: 11 | Status: SHIPPED | OUTPATIENT
Start: 2023-11-07 | End: 2024-02-28

## 2023-11-07 NOTE — TELEPHONE ENCOUNTER
"Request for medication refill: finasteride (PROPECIA) 1 MG tablet     Providers if patient needs an appointment and you are willing to give a one month supply please refill for one month and  send a letter/MyChart using \".SMILLIMITEDREFILL\" .smillimited and route chart to \"[facility] \" (Giving one month refill in non controlled medications is strongly recommended before denial)    If refill has been denied, meaning absolutely no refills without visit, please complete the smart phrase \".smirxrefuse\" and route it to the \"[facility] MED REFILLS\"  pool to inform the patient and the pharmacy.    Shivani Rivera, CMA    "

## 2024-01-27 ENCOUNTER — HEALTH MAINTENANCE LETTER (OUTPATIENT)
Age: 24
End: 2024-01-27

## 2024-01-29 ENCOUNTER — OFFICE VISIT (OUTPATIENT)
Dept: FAMILY MEDICINE | Facility: CLINIC | Age: 24
End: 2024-01-29
Payer: COMMERCIAL

## 2024-01-29 VITALS
RESPIRATION RATE: 16 BRPM | DIASTOLIC BLOOD PRESSURE: 71 MMHG | WEIGHT: 137.3 LBS | HEART RATE: 71 BPM | SYSTOLIC BLOOD PRESSURE: 115 MMHG | HEIGHT: 69 IN | OXYGEN SATURATION: 98 % | BODY MASS INDEX: 20.34 KG/M2 | TEMPERATURE: 98 F

## 2024-01-29 DIAGNOSIS — Z00.00 ANNUAL PHYSICAL EXAM: Primary | ICD-10-CM

## 2024-01-29 DIAGNOSIS — H93.13 TINNITUS, BILATERAL: ICD-10-CM

## 2024-01-29 PROCEDURE — 99395 PREV VISIT EST AGE 18-39: CPT | Mod: GC | Performed by: STUDENT IN AN ORGANIZED HEALTH CARE EDUCATION/TRAINING PROGRAM

## 2024-01-29 ASSESSMENT — ENCOUNTER SYMPTOMS
MYALGIAS: 0
PALPITATIONS: 0
NERVOUS/ANXIOUS: 0
DIARRHEA: 0
NAUSEA: 0
HEADACHES: 0
CONSTIPATION: 0
SORE THROAT: 0
PARESTHESIAS: 0
HEMATOCHEZIA: 0
SHORTNESS OF BREATH: 0
HEMATURIA: 0
FEVER: 0
DYSURIA: 0
HEARTBURN: 0
COUGH: 0
JOINT SWELLING: 0
ABDOMINAL PAIN: 0
CHILLS: 0
FREQUENCY: 0
EYE PAIN: 0
ARTHRALGIAS: 0
WEAKNESS: 0

## 2024-01-29 NOTE — COMMUNITY RESOURCES LIST (ENGLISH)
01/29/2024   [facility]  N/A  For questions about this resource list or additional care needs, please contact your primary care clinic or care manager.  Phone: [phone]   Email: N/A   Address: [facility] [place] [address]   Hours: N/A        Hotlines and Helplines       Hotline - Housing crisis  1  [facility] (Main Office) Distance: 4.14 miles      Phone/Virtual   [address] [place] [address]  Language: English  Hours: Mon - Sun Open 24 Hours   Phone: (999) 802-9628 Email: info@[facility] Website: http://[facility].org     2  [facility] Distance: 9.36 miles      Phone/Virtual   [address] [place] [address]  Language: English  Hours: Mon - Sun Open 24 Hours   Phone: (462) 301-7676 Email: communications@\[facility]\""-[place].org Website: https://\[facility]\""-[place].org/oursaviourshousing/          Housing       Coordinated Entry access point  3  [facility] Distance: 8.51 miles      In-Person, Phone/Virtual   [address] [place] [address]  Language: English  Hours: Mon - Fri 8:30 AM - 4:30 PM  Fees: Free   Phone: (715) 319-5328 Email: info@[facility].org Website: https://www.[facility].org/locations/[facility]/     4  [facility] ([facility] - [facility] Services Distance: 9.34 miles      In-Person   [address] [place] [address]  Language: English  Hours: Mon - Fri 9:00 AM - 5:00 PM  Fees: Free   Phone: (472) 930-9927 Email: housing@[facility].org Website: http://www.[facility].org/housing     Drop-in center or day shelter  5  [facility] Distance: 9.75 miles      In-Person   [address] [place] [address]  Language: English  Hours: Mon - Fri 12:00 PM - 3:00 PM  Fees: Free   Phone: (915) 374-6689 Email: Dr. Tariff@[employer] Website: http://[facility].org/     6  [facility] Distance: 9.77 miles       In-Person   [address] [place] [address]  Language: English, Malaysian, Hong Konger  Hours: Mon - Sat 7:00 AM - 3:00 PM  Fees: Free, Self Pay   Phone: (342) 396-4368 Email: info@[employer] Website: https://www.[employer]/locations/opportunity-center/     Housing search assistance  7  [facility] - Online Housing Search Assistance Distance: 5.43 miles      Phone/Virtual   [address] [place] [address]  Language: English  Hours: Mon - Sun Open 24 Hours  Fees: Free   Phone: (618) 925-2142 Email: findabbybrayden@[employer] Website: https://[employer]/     8  [facility] and Disability Services Distance: 5.65 miles      In-Person   [address] [place] [address]  Language: English  Hours: Mon - Fri 8:00 AM - 4:00 PM  Fees: Free, Insurance, Sliding Fee   Phone: (761) 152-1815 Email: ramírez@[facility] Website: https://www.[facility]./HealthFamily/Disabilities     Shelter for families  9  [facility] Distance: 2.23 miles      In-Person   [address] [place] [address]  Language: English  Hours: Mon - Sun Open 24 Hours  Fees: Free, Sliding Fee   Phone: (306) 391-4126 Ext.1 Email: info@[facility] Website: http://www.[facility].NowThis News     10  [facility] Distance: 23.95 miles      In-Person   [address] [place] [address]  Language: English  Hours: Mon - Fri 3:00 PM - 9:00 AM , Sat - Sun Open 24 Hours  Fees: Free   Phone: (932) 543-2096 Ext.1 Website: https://www.[facility].org/2020/07/03/emergency-family-shelter/     Shelter for individuals  11  [facility] Distance: 7.59 miles      In-Person   [address] [place] [address]  Language: English, Hong Konger  Hours: Mon - Fri 8:00 AM - 4:30 PM  Fees: Free   Phone: (312) 256-8377 Email: info@[employer].org Website: http://www.[facility].org      12  [facility] [place] [facility] [place] [facility] Distance: 8.5 miles      In-Person   [address] [place] [address]  Language: English  Hours: Mon - Sun 5:00 PM - 10:00 AM  Fees: Free, Self Pay   Phone: (232) 540-9240 Email: info@[facility] Website: https://www.[facility]/locations/[facility]/          Important Numbers & Websites       Emergency Services   911  [facility] Services   311  Poison Control   (626) 648-8326  Suicide Prevention Lifeline   (513) 960-5385 (TALK)  Child Abuse Hotline   (949) 879-9709 (4-A-Child)  Sexual Assault Hotline   (648) 129-8164 (HOPE)  National Runaway Safeline   (659) 969-8887 (RUNAWAY)  All-Options Talkline   (600) 626-8675  Substance Abuse Referral   (517) 612-1442 (HELP)

## 2024-01-29 NOTE — PROGRESS NOTES
"Preventive Care Visit  [facility] MISSAEL Hill DO, Student in organized health care education/training program  Jan 29, 2024      SUBJECTIVE:   Sea is a 23 year old, presenting for the following:  Physical      Healthy Habits:     Getting at least 3 servings of Calcium per day:  Yes    Bi-annual eye exam:  Yes    Dental care twice a year:  Yes    Sleep apnea or symptoms of sleep apnea:  None    Diet:  Regular (no restrictions)    Frequency of exercise:  2-3 days/week    Duration of exercise:  15-30 minutes    Taking medications regularly:  Yes    Medication side effects:  None    Additional concerns today:  No      Annual  Employment / School - 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[employer]),  taking calls, 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, planning on returning to school this summer  Home life - dad is now in the [place] (was visit for 4-5 months, can back Feb in 2022); mom still lives back in [place] - he is still living with his brothers  Nutrition - eating out on the weekends, otherwise trying to cook at home  Physical - working out twice to thrice a week   Substance - xE, xO, xTNV, xMJ, xRxn  Sexual - currently not active  Mood - \"ok\" - PHQ2 is negative  Sleep - \"pretty good\" - gets 6-7 hours     Health Screens and Vaccines  HPV Dose 1  DTAP Booster  Flu Dose 1  COVID Dose 1  Not interested   PHQ2    MedHx  Finasteride 1mg - Androgenic Alopecia (only been on this for year) - for the last month and half taking it EoD, before that every day     Social History     Tobacco Use    Smoking status: Never    Smokeless tobacco: Never   Substance Use Topics    Alcohol use: No             1/29/2024    10:12 AM   Alcohol Use   Prescreen: >3 drinks/day or >7 drinks/week? No       Last PSA: No results found for: \"PSA\"    Reviewed orders with patient. Reviewed health maintenance and updated orders accordingly - Yes      Reviewed and updated as needed this visit by clinical staff   Tobacco " " Allergies  Meds              Reviewed and updated as needed this visit by Provider                  No past medical history on file.   Past Surgical History:   Procedure Laterality Date    NO HISTORY OF SURGERY       Review of Systems   Constitutional:  Negative for chills and fever.   HENT:  Positive for ear pain and hearing loss. Negative for congestion and sore throat.    Eyes:  Positive for visual disturbance. Negative for pain.   Respiratory:  Negative for cough and shortness of breath.    Cardiovascular:  Negative for chest pain and palpitations.   Gastrointestinal:  Negative for abdominal pain, constipation, diarrhea and nausea.   Genitourinary:  Negative for dysuria, frequency, genital sores, hematuria, penile discharge and urgency.   Musculoskeletal:  Negative for arthralgias, joint swelling and myalgias.   Skin:  Negative for rash.   Neurological:  Negative for weakness and headaches.   Psychiatric/Behavioral:  The patient is not nervous/anxious.        OBJECTIVE:   /71   Pulse 71   Temp 98  F (36.7  C) (Oral)   Resp 16   Ht 1.75 m (5' 8.9\")   Wt 62.3 kg (137 lb 4.8 oz)   SpO2 98%   BMI 20.34 kg/m     Estimated body mass index is 20.34 kg/m  as calculated from the following:    Height as of this encounter: 1.75 m (5' 8.9\").    Weight as of this encounter: 62.3 kg (137 lb 4.8 oz).  Physical Exam  GENERAL: alert and no distress  EYES: Eyes grossly normal to inspection, PERRL and conjunctivae and sclerae normal  HENT: ear canals and TM's normal, nose and mouth without ulcers or lesions  NECK: no adenopathy, no asymmetry, masses, or scars  RESP: lungs clear to auscultation - no rales, rhonchi or wheezes  CV: regular rate and rhythm, normal S1 S2, no S3 or S4, no murmur, click or rub, no peripheral edema  ABDOMEN: soft, nontender, no hepatosplenomegaly, no masses and bowel sounds normal  MS: no gross musculoskeletal defects noted, no edema  NEURO: Normal strength and tone, mentation intact and " speech normal  BACK: no CVA tenderness, no paralumbar tenderness    ASSESSMENT/PLAN:   Annual physical exam  Tinnitus   Annual exam today. Not interested in any vaccines today. Reviewed health lifestyle habits. His ear sensations at this stage seem to be tinnitus seeing that they stretch back prior 2022. Discussed safe ear and hearing practices around music and cleaning.     Counseling  Reviewed preventive health counseling, as reflected in patient instructions        He reports that he has never smoked. He has never used smokeless tobacco.            Signed Electronically by: Ace Hill DO

## 2024-01-31 ENCOUNTER — OFFICE VISIT (OUTPATIENT)
Dept: FAMILY MEDICINE | Facility: CLINIC | Age: 24
End: 2024-01-31
Payer: COMMERCIAL

## 2024-01-31 VITALS
SYSTOLIC BLOOD PRESSURE: 111 MMHG | TEMPERATURE: 98.2 F | HEIGHT: 69 IN | BODY MASS INDEX: 20.38 KG/M2 | WEIGHT: 137.6 LBS | HEART RATE: 80 BPM | DIASTOLIC BLOOD PRESSURE: 77 MMHG | RESPIRATION RATE: 16 BRPM | OXYGEN SATURATION: 95 %

## 2024-01-31 DIAGNOSIS — Z13.828 SCOLIOSIS CONCERN: Primary | ICD-10-CM

## 2024-01-31 PROCEDURE — 99213 OFFICE O/P EST LOW 20 MIN: CPT | Mod: GC | Performed by: STUDENT IN AN ORGANIZED HEALTH CARE EDUCATION/TRAINING PROGRAM

## 2024-01-31 NOTE — PROGRESS NOTES
"  Assessment & Plan     Scoliosis concern  Patient concerned of spinal misalignment. Exam did demonstrate slight misalignment with the left shoulder lower than the right, and a slight right rib hump on flexion. He has NO symptoms, but did note he often sits slouched. We discussed posture and continuing his workout regimen to ensure mobility and strength. Patient was worried about spinal alignment so XR was done - no concerns.     - XR Thoracic Spine 2 Views; Future      Subjective   Sea is a 23 year old, presenting for the following health issues:  RECHECK    HPI     Back Alignment  Concerned his left shoulder is out of alignment  No pain concerns  Works out often  Plays rec basketball without much concern or restriction          Objective    /77   Pulse 80   Temp 98.2  F (36.8  C) (Oral)   Resp 16   Ht 1.753 m (5' 9\")   Wt 62.4 kg (137 lb 9.6 oz)   SpO2 95%   BMI 20.32 kg/m    Body mass index is 20.32 kg/m .  Physical Exam   GENERAL: alert and no distress  NECK: no adenopathy, no asymmetry, masses, or scars  RESP: lungs clear to auscultation - no rales, rhonchi or wheezes  CV: regular rate and rhythm, normal S1 S2, no S3 or S4, no murmur, click or rub, no peripheral edema  ABDOMEN: soft, nontender, no hepatosplenomegaly, no masses and bowel sounds normal  MS:   - Noted a slight right rib hump  - Noted a slightly lower left shoulder as compared to right  - Spine is straight without curve laterally  - Full ROM in flexion / extension / rotation at lumbar level  - Full ROM at shoulder, negative impingement signs     Signed Electronically by: Ace Hill DO    "

## 2024-01-31 NOTE — PROGRESS NOTES
Physical Therapy    Physical Therapy Evaluation    Patient Name: Levy Gregory  MRN: [id number]  Today's Date: 11/30/2023   Time Calculation  Start Time: 1159  Stop Time: 1234  Time Calculation (min): 35 min    Assessment/Plan   PT Assessment  PT Assessment Results: Decreased strength, Decreased endurance, Decreased mobility, Pain  Rehab Prognosis: Good  Barriers to Discharge: pain management  Evaluation/Treatment Tolerance: Patient limited by pain  Medical Staff Made Aware: Yes  Strengths: Support of Caregivers, Housing layout  Barriers to Participation: Premorbid level of function  End of Session Communication: Bedside nurse  Assessment Comment: Pt primarily limited by pain during PT evaluation. Pt had difficulty tolerating HOB elevation and was unable to achieve full upright position sitting EOB due to pain and refusal to move further. Pt educated on importance of daily mobility. Pt requires assist at baseline for ADLs and mRADLs, pt will benefit from continued skilled PT to address deficits. Recommend moderate intensity PT at D/C.  End of Session Patient Position: Bed, 3 rail up  IP OR SWING BED PT PLAN  Inpatient or Swing Bed: Inpatient  PT Plan  Treatment/Interventions: Bed mobility, Transfer training, Gait training, Stair training, Balance training, Neuromuscular re-education, Strengthening, Endurance training, Range of motion, Therapeutic exercise, Therapeutic activity, Positioning  PT Plan: Skilled PT  PT Frequency: 5 times per week  PT Discharge Recommendations: Moderate intensity level of continued care  PT Recommended Transfer Status: Assist x2  PT - OK to Discharge: Yes      Subjective   General Visit Information:  Reason for Referral: s/p laparotomy, lysis of adhesions, gastric pullup, and jejunostomy placement on 11/29  Past Medical History Relevant to Rehab: PMH: COPD, T2DM, GERD, type II achalasia. Prolonged hospitalization 3/2023-5/2023 d/t complications from esophageal hernia surgery. Pt  Preceptor Attestation:   Patient seen, evaluated and discussed with the resident. I have verified the content of the note, which accurately reflects my assessment of the patient and the plan of care.   Supervising Physician:  Renato Quiroga MD    admitted to [facility] and readmitted to hospital 8/2023 for worsening Empyema.  Prior to Session Communication: Bedside nurse  Patient Position Received: Bed, 3 rail up  Family/Caregiver Present: Yes  Caregiver Feedback: wife present at end of session  General Comment: Pt cooperative, reporting neck and abdominal pain, agreeable to PT with encouragement. Pt on 0.5 phenylephrine   Home Living:  Home Living  Type of Home: House  Lives With: Spouse  Home Adaptive Equipment: Wheelchair-manual, Walker rolling or standard (lift chair)  Home Layout: Two level, Able to live on main level with bedroom/bathroom  Home Access: Stairs to enter with rails  Entrance Stairs-Rails: Both  Entrance Stairs-Number of Steps: 3  Bathroom Shower/Tub: Walk-in shower  Bathroom Equipment: Built-in shower seat  Prior Level of Function:  Prior Function Per Pt/Caregiver Report  Level of [place]: Needs assistance with functional transfers, Needs assistance with homemaking, Needs assistance with ADLs  Receives Help From:  (wife)  ADL Assistance: Needs assistance (Wife reports set up-Tk for dressing)  Homemaking Assistance: Needs assistance  Ambulatory Assistance: Needs assistance  Transfers: Stand by  Gait: Stand by (wife provides a wheelchair follow)  Vocational: Retired (Owned a landscaping company for 40 years, enjoys fishing on his boat)  Prior Function Comments: Wife reports pt sits in lift chair and completes seated exercises from youtube during the day  Precautions:  Precautions  Hearing/Visual Limitations: WFL  Medical Precautions:  (goal MAPs of 60-65)  Post-Surgical Precautions: Abdominal surgery precautions  Precautions Comment: Contact precautions  Vital Signs:  Vital Signs  Heart Rate:  (PRE: 94 POST: 96)  Heart Rate Source: Monitor  Resp: 17  SpO2: 94 % (3L NC)  BP:  (PRE: 99/67 POST: 104/68)  MAP (mmHg): 80  BP Location: Left arm  BP Method: Automatic  Patient Position: Lying    Objective   Lines/Tubes/Drains:  Arterial Line  11/29/23 Right Radial (Active)   Number of days: 1       PICC - Adult 10/16/23 Double lumen Right Basilic vein (Active)   Number of days: 45       Urethral Catheter Straight-tip 16 Fr. (Active)   Number of days: 1       Closed/Suction Drain Left;Anterior Chest Bulb 7 Fr. (Active)   Number of days: 0       NG/OG/Feeding Tube NG - Upson sump 16 Fr Left nostril (Active)   Number of days: 1       Gastrostomy/Enterostomy Jejunostomy 14 Fr. LLQ (Active)   Number of days: 1     Continuous Medications/Drips:  lactated Ringer's, 125 mL/hr, Last Rate: 125 mL/hr (11/30/23 1200)  phenylephrine, 0.1-2 mcg/kg/min, Last Rate: 0.1 mcg/kg/min (11/30/23 1300)      Oxygen: 3L     Pain:  Pain Assessment  Pain Assessment: 0-10  Pain Score: 10 - Worst possible pain  Pain Type: Acute pain  Pain Location: Abdomen  Pain Interventions: Relaxation technique  Cognition:  Cognition  Overall Cognitive Status: Within Functional Limits  Orientation Level: Oriented X4      Extremity/Trunk Assessments:  Strength:    RLE   RLE : Within Functional Limits (4+/5 all muscle groups)  LLE   LLE : Within Functional Limits (4+/5 all muscle groups)    General Assessments:         Activity Tolerance  Endurance: Tolerates 10 - 20 min exercise with multiple rests  Activity Tolerance Comments: Pt limited by pain/nausea despite medications  Sensation  Light Touch: No apparent deficits       Functional Assessments:  Bed Mobility  Bed Mobility: Yes  Bed Mobility 1  Bed Mobility 1: Supine to sitting, Sitting to supine  Level of Assistance 1: Maximum assistance  Bed Mobility Comments 1: Atttempted log rolling technique x 2 times for abdominal precautions, pt able to achieve 50% of full upright sitting EOB with maxA before bringing BLE back on to bed and refusing further trials due to discomfort and pain.  Bed Mobility 2  Bed Mobility  2:  (Elevating HOB)  Bed Mobility Comments 2: Elevated HOB to assess vitals and pain response, pt reporting significant increase in  chest/abdominal pain with upright sitting - RN notifed and able to give pt pain and nausea medications prior to sitting EOB trial.          Outcome Measures:  [facility] Basic Mobility  Turning from your back to your side while in a flat bed without using bedrails: A lot  Moving from lying on your back to sitting on the side of a flat bed without using bedrails: A lot  Moving to and from bed to chair (including a wheelchair): Total  Standing up from a chair using your arms (e.g. wheelchair or bedside chair): Total  To walk in hospital room: Total  Climbing 3-5 steps with railing: Total  Basic Mobility - Total Score: 8           FSS-ICU  Ambulation: Unable to attempt due to weakness  Rolling: Maximal assistance (performs 25% - 49% of task)  Sitting: Total assistance (performs 25% or requires another person)  Transfer Sit-to-Stand: Unable to perform  Transfer Supine-to-Sit: Unable to perform  Total Score: 3                   Encounter Problems       Encounter Problems (Active)       Balance       STG - Maintains static standing balance with upper extremity support using FWW with Tk >3 minutes        Start:  11/30/23    Expected End:  12/14/23       INTERVENTIONS:  1. Practice standing with minimal support.  2. Educate patient about standing tolerance.  3. Educate patient about independence with gait, transfers, and ADL's.  4. Educate patient about use of assistive device.  5. Educate patient about self-directed care.            Mobility       STG - Patient will ambulate >20ft using FWW        Start:  11/30/23    Expected End:  12/14/23               Pain          Transfers       STG - Patient to transfer to and from sit to supine CGA        Start:  11/30/23    Expected End:  12/14/23            STG - Patient will transfer sit to and from stand CGA        Start:  11/30/23    Expected End:  12/14/23                   Education Documentation  Precautions, taught by Tatianna Herrera PT at 11/30/2023  1:39 PM.  Learner:  Patient  Readiness: Acceptance  Method: Explanation  Response: Needs Reinforcement    Body Mechanics, taught by Tatianna Herrera PT at 11/30/2023  1:39 PM.  Learner: Patient  Readiness: Acceptance  Method: Explanation  Response: Needs Reinforcement    Mobility Training, taught by Tatianna Herrera PT at 11/30/2023  1:39 PM.  Learner: Patient  Readiness: Acceptance  Method: Explanation  Response: Needs Reinforcement    Education Comments  No comments found.      11/30/23 at 2:06 PM   Tatianna Herrera PT   Rehab Office: [phone]

## 2024-02-01 ENCOUNTER — ANCILLARY PROCEDURE (OUTPATIENT)
Dept: GENERAL RADIOLOGY | Facility: CLINIC | Age: 24
End: 2024-02-01
Payer: COMMERCIAL

## 2024-02-01 ENCOUNTER — APPOINTMENT (OUTPATIENT)
Dept: LAB | Facility: CLINIC | Age: 24
End: 2024-02-01
Payer: COMMERCIAL

## 2024-02-01 DIAGNOSIS — Z13.828 SCOLIOSIS CONCERN: ICD-10-CM

## 2024-02-01 PROCEDURE — 72070 X-RAY EXAM THORAC SPINE 2VWS: CPT | Mod: FY | Performed by: STUDENT IN AN ORGANIZED HEALTH CARE EDUCATION/TRAINING PROGRAM

## 2024-02-16 ENCOUNTER — OFFICE VISIT (OUTPATIENT)
Dept: FAMILY MEDICINE | Facility: CLINIC | Age: 24
End: 2024-02-16
Payer: COMMERCIAL

## 2024-02-16 VITALS
BODY MASS INDEX: 19.91 KG/M2 | OXYGEN SATURATION: 100 % | HEART RATE: 68 BPM | RESPIRATION RATE: 18 BRPM | HEIGHT: 69 IN | SYSTOLIC BLOOD PRESSURE: 112 MMHG | WEIGHT: 134.4 LBS | DIASTOLIC BLOOD PRESSURE: 71 MMHG | TEMPERATURE: 98.2 F

## 2024-02-16 DIAGNOSIS — L65.9 ALOPECIA: Primary | ICD-10-CM

## 2024-02-16 PROCEDURE — 99213 OFFICE O/P EST LOW 20 MIN: CPT | Mod: GC | Performed by: STUDENT IN AN ORGANIZED HEALTH CARE EDUCATION/TRAINING PROGRAM

## 2024-02-16 ASSESSMENT — PAIN SCALES - GENERAL: PAINLEVEL: NO PAIN (0)

## 2024-02-16 NOTE — PROGRESS NOTES
"  Assessment & Plan     Alopecia  Patient with few year history of itchy scalp and flaking as well as diffuse hair loss on top of scalp.  After trying ketoconazole shampoo twice, has not achieved resolution.  No changes in detergents or hats or other items that may contact the scalp lately.  Given that pattern of hair loss does not match pattern expected with male pattern baldness and does not contain clearly demarcated circular areas of alopecia as would be expected in tinea as well as failure of antifungal treatment, will refer to dermatology for further evaluation.  - Adult Dermatology  Referral          No follow-ups on file.    Subjective   Sea is a 23 year old, presenting for the following health issues:  Hair/Scalp Problem (Itching x 2 months, hair very dry)      2/16/2024     4:30 PM   Additional Questions   Roomed by Carlita     HPI     Itchy scalp x few years   On and off  Past few months more flaky and itchy and dry  Used to get better after showering but not anymore  Used fluconazole shampoo a year ago, did not work    Also was using minoxodil for hair thinning, which has been going on about a year. He thinks it may coincide with scalp symptoms. Fluconazole again used two weeks ago, didn't work        Objective    /71   Pulse 68   Temp 98.2  F (36.8  C)   Resp 18   Ht 1.74 m (5' 8.5\")   Wt 61 kg (134 lb 6.4 oz)   SpO2 100%   BMI 20.14 kg/m    Body mass index is 20.14 kg/m .  Physical Exam  Constitutional:       General: He is not in acute distress.     Appearance: Normal appearance.   Pulmonary:      Effort: Pulmonary effort is normal. No respiratory distress.   Musculoskeletal:         General: Normal range of motion.   Skin:     Findings: Rash present. Rash is scaling.      Comments: Diffuse hair loss on top of scalp. Slight flaking, itchy per patient. Hair loss pattern not consistent with male pattern baldness.   Neurological:      Mental Status: He is alert and oriented to " person, place, and time.   Psychiatric:         Mood and Affect: Mood normal.         Behavior: Behavior normal.                    Signed Electronically by: Leonel Schofield MD

## 2024-02-16 NOTE — PATIENT INSTRUCTIONS
Patient Education   Here is the plan from today's visit    1. Alopecia  - Adult Dermatology  Referral; Future          Please call or return to clinic if your symptoms don't go away.    Follow up plan  No follow-ups on file.    Thank you for coming to [facility] today.  Lab Testing:  **If you had lab testing today and your results are reassuring or normal they will be mailed to you or sent through [employer] within 7 days.   **If the lab tests need quick action we will call you with the results.  **If you are having labs done on a different day, please call [phone] to schedule at [facility] or [phone] for other [facility] Outpatient Lab locations. Labs do not offer walk-in appointments.  The phone number we will call with results is # [phone] (home) . If this is not the best number please call our clinic and change the number.  Medication Refills:  If you need any refills please call your pharmacy and they will contact us.   If you need to  your refill at a new pharmacy, please contact the new pharmacy directly. The new pharmacy will help you get your medications transferred faster.   Scheduling:  If you have any concerns about today's visit or wish to schedule another appointment please call our office during normal business hours [phone] (8-5:00 M-F). If you can no longer make a scheduled visit, please cancel via [employer] or call us to cancel.   If a referral was made to an [facility] specialty provider and you do not get a call from central scheduling, please refer to directions on your visit summary or call our office during normal business hours for assistance.   If a Mammogram was ordered for you at the [facility] call [phone] to schedule or change your appointment.  If you had an XRay/CT/Ultrasound/MRI ordered the number is [phone] to schedule or change your radiology appointment.   [facility] has limited ultrasound appointments available on  Wednesdays, if you would like your ultrasound at [facility], please call [phone] to schedule.   Medical Concerns:  If you have urgent medical concerns please call [phone] at any time of the day.    Leonel Schofield MD

## 2024-02-16 NOTE — PROGRESS NOTES
Preceptor Attestation:   Patient seen, evaluated and discussed with the resident. I have verified the content of the note, which accurately reflects my assessment of the patient and the plan of care.   Supervising Physician:  Renato Quiroga MD

## 2024-02-28 ENCOUNTER — OFFICE VISIT (OUTPATIENT)
Dept: FAMILY MEDICINE | Facility: CLINIC | Age: 24
End: 2024-02-28
Payer: COMMERCIAL

## 2024-02-28 VITALS
HEART RATE: 57 BPM | TEMPERATURE: 98.4 F | HEIGHT: 70 IN | OXYGEN SATURATION: 99 % | WEIGHT: 136.2 LBS | BODY MASS INDEX: 19.5 KG/M2 | SYSTOLIC BLOOD PRESSURE: 107 MMHG | DIASTOLIC BLOOD PRESSURE: 69 MMHG

## 2024-02-28 DIAGNOSIS — B35.0 TINEA CAPITIS: ICD-10-CM

## 2024-02-28 DIAGNOSIS — L64.9 ANDROGENETIC ALOPECIA: ICD-10-CM

## 2024-02-28 DIAGNOSIS — Z51.81 ENCOUNTER FOR THERAPEUTIC DRUG MONITORING: Primary | ICD-10-CM

## 2024-02-28 PROCEDURE — 80053 COMPREHEN METABOLIC PANEL: CPT

## 2024-02-28 PROCEDURE — 36415 COLL VENOUS BLD VENIPUNCTURE: CPT

## 2024-02-28 PROCEDURE — 99213 OFFICE O/P EST LOW 20 MIN: CPT | Mod: GC

## 2024-02-28 RX ORDER — FINASTERIDE 1 MG/1
1 TABLET, FILM COATED ORAL DAILY
Qty: 30 TABLET | Refills: 11 | Status: SHIPPED | OUTPATIENT
Start: 2024-02-28 | End: 2024-03-13

## 2024-02-28 RX ORDER — GRISEOFULVIN 250 MG/1
500 TABLET ORAL 2 TIMES DAILY
Qty: 84 TABLET | Refills: 0 | Status: SHIPPED | OUTPATIENT
Start: 2024-02-28 | End: 2024-03-15

## 2024-02-28 NOTE — PROGRESS NOTES
As fungal organisms invade the follicle where topical therapy is ineffective, dermatophytosis of the scalp needs to be treated with oral agents. Oral terbinafine, fluconazole, and itraconazole are now considered alternate first-line therapies, all with the advantage of shorter treatment duration. The exception is infection with M canis, where griseofulvin or an azole is considered superior.  Terbinafine 250 mg every 24 hours for 4-8 weeks, or  Fluconazole 3-6 mg/kg every 24 hours for 3-6 weeks, or  Itraconazole 3-6 mg/kg every 24 hours for 4-6 weeks, or  Griseofulvin microsize 20-25 mg/kg every 24 hours for 6-12 weeks, (1500mg)  Griseofulvin ultra-microsize 10-15 mg/kg every 24 hours for 6-12 weeks.

## 2024-02-28 NOTE — PROGRESS NOTES
Assessment & Plan     Tinea capitis  Androgenetic alopecia  Intermittent pruritus and diffuse loss for the past 2 years. Has transient improvement with ketoconazole shampoo. Likely tinea capitis based on exam. Also considered psoriasis, alopecia, telogen effluvium, atopic dermitis from rxn to topical minoxidil. Black dots, broken hair shafts, scale+erythema surrounding hair follicles on dermatoscope exam combined with transient improvement with ketoconazole shampoo very consistent w tinea capitis, therefore KOH prep not indicated. Plan to check liver and kidney function prior to starting griseofulvin with follow up and recheck in 6 weeks. If CMP still normal in 6 weeks, continue for total 12 weeks therapy.   - griseofulvin microsize (GRIFULVIN V) 500 MG tablet  Dispense: 84 tablet; Refill: 0  - finasteride (PROPECIA) 1 MG tablet  Dispense: 30 tablet; Refill: 11    Encounter for therapeutic drug monitoring  Starting griseofulvin twice daily for 12 weeks. Plan to check liver and kidney function today and again in 6 weeks.  - Comprehensive metabolic panel      Return in about 6 weeks (around 4/10/2024).    Luiz Osei is a 23 year old, presenting for the following health issues:  Follow Up (Dandruff follow up )      2/28/2024    11:02 AM   Additional Questions   Roomed by Jc         2/28/2024    Information    services provided? No     HPI   Here for follow up appointment after 2/16 visit for his itchy scalp. Has had itchiness on and off for the past two years. Was given a dermatology referral at previous visit but next available appointment is close to a year out so he made this appointment to see what can be done in the interim. Ketoconazole shampoo has helped but not solved the issue. Has been on minoxidil liquid for hair thinning for the past year but feels like that has been making his scalp more itchy and dry. He did see improvement in his hair loss when using the liquid, except  "it made his scalp more itchy. Would like to be put on the minoxidil tablet.    Never had eczema. No other skin issues. No allergies. No one else in the family has similar issues. Has never tried topical corticosteroids.      Review of Systems  Constitutional, HEENT, cardiovascular, pulmonary, gi and gu systems are negative, except as otherwise noted.      Objective    /69   Pulse 57   Temp 98.4  F (36.9  C) (Oral)   Ht 1.778 m (5' 10\")   Wt 61.8 kg (136 lb 3.2 oz)   SpO2 99%   BMI 19.54 kg/m    Body mass index is 19.54 kg/m .  Physical Exam   GENERAL: alert and no distress  NECK: no adenopathy, no asymmetry, masses, or scars  RESP: lungs clear to auscultation - no rales, rhonchi or wheezes  CV: regular rate and rhythm, normal S1 S2, no S3 or S4, no murmur, click or rub, no peripheral edema  MS: no gross musculoskeletal defects noted, no edema  SKIN: On the scalp there is diffuse hair loss with concentration in occipital region. On dermatoscopic exam there are pin point black macules and mild scale and erythema surrounding the hair follicles. Negative hair pull test. No focal plaque. No lichenification. No mites.        Results pending    Signed Electronically by: MD Lisandra Donovan, MS3    "

## 2024-02-28 NOTE — PATIENT INSTRUCTIONS
Patient Education   Here is the plan from today's visit    Tinea capitis  - griseofulvin microsize (GRIFULVIN V) 500 MG tablet; Take 1 tablet (500 mg) by mouth 2 times daily for 42 days  Dispense: 84 tablet; Refill: 0  THEN return to clinic for retesting of kidney and liver, will continue for another 6 weeks if those labs are normal    Androgenetic alopecia  - finasteride (PROPECIA) 1 MG tablet; Take 1 tablet (1 mg) by mouth daily  Dispense: 30 tablet; Refill: 11      Please call or return to clinic if your symptoms don't go away.    Follow up plan  Return in about 6 weeks (around 4/10/2024).    Thank you for coming to [facility] today.  Lab Testing:  **If you had lab testing today and your results are reassuring or normal they will be mailed to you or sent through [employer] within 7 days.   **If the lab tests need quick action we will call you with the results.  **If you are having labs done on a different day, please call [phone] to schedule at [facility] or [phone] for other [facility] Outpatient Lab locations. Labs do not offer walk-in appointments.  The phone number we will call with results is # [phone] (home) . If this is not the best number please call our clinic and change the number.  Medication Refills:  If you need any refills please call your pharmacy and they will contact us.   If you need to  your refill at a new pharmacy, please contact the new pharmacy directly. The new pharmacy will help you get your medications transferred faster.   Scheduling:  If you have any concerns about today's visit or wish to schedule another appointment please call our office during normal business hours [phone] (8-5:00 M-F). If you can no longer make a scheduled visit, please cancel via [employer] or call us to cancel.   If a referral was made to an [facility] specialty provider and you do not get a call from central scheduling, please refer to directions on your visit summary or  call our office during normal business hours for assistance.   If a Mammogram was ordered for you at the Breast Center call [phone] to schedule or change your appointment.  If you had an XRay/CT/Ultrasound/MRI ordered the number is [phone] to schedule or change your radiology appointment.   [facility] has limited ultrasound appointments available on Wednesdays, if you would like your ultrasound at [facility], please call [phone] to schedule.   Medical Concerns:  If you have urgent medical concerns please call [phone] at any time of the day.    Rossy Gutierrez MD

## 2024-02-29 LAB
ALBUMIN SERPL BCG-MCNC: 4.6 G/DL (ref 3.5–5.2)
ALP SERPL-CCNC: 96 U/L (ref 40–150)
ALT SERPL W P-5'-P-CCNC: 19 U/L (ref 0–70)
ANION GAP SERPL CALCULATED.3IONS-SCNC: 10 MMOL/L (ref 7–15)
AST SERPL W P-5'-P-CCNC: 23 U/L (ref 0–45)
BILIRUB SERPL-MCNC: 0.5 MG/DL
BUN SERPL-MCNC: 9.8 MG/DL (ref 6–20)
CALCIUM SERPL-MCNC: 9.7 MG/DL (ref 8.6–10)
CHLORIDE SERPL-SCNC: 102 MMOL/L (ref 98–107)
CREAT SERPL-MCNC: 1.09 MG/DL (ref 0.67–1.17)
DEPRECATED HCO3 PLAS-SCNC: 26 MMOL/L (ref 22–29)
EGFRCR SERPLBLD CKD-EPI 2021: >90 ML/MIN/1.73M2
GLUCOSE SERPL-MCNC: 83 MG/DL (ref 70–99)
POTASSIUM SERPL-SCNC: 4.3 MMOL/L (ref 3.4–5.3)
PROT SERPL-MCNC: 7.3 G/DL (ref 6.4–8.3)
SODIUM SERPL-SCNC: 138 MMOL/L (ref 135–145)

## 2024-03-13 ENCOUNTER — OFFICE VISIT (OUTPATIENT)
Dept: FAMILY MEDICINE | Facility: CLINIC | Age: 24
End: 2024-03-13
Payer: COMMERCIAL

## 2024-03-13 VITALS
HEIGHT: 69 IN | DIASTOLIC BLOOD PRESSURE: 77 MMHG | HEART RATE: 71 BPM | BODY MASS INDEX: 19.3 KG/M2 | OXYGEN SATURATION: 100 % | WEIGHT: 130.3 LBS | SYSTOLIC BLOOD PRESSURE: 132 MMHG

## 2024-03-13 DIAGNOSIS — L64.9 ANDROGENETIC ALOPECIA: ICD-10-CM

## 2024-03-13 PROCEDURE — 99213 OFFICE O/P EST LOW 20 MIN: CPT | Performed by: FAMILY MEDICINE

## 2024-03-13 RX ORDER — MINOXIDIL 10 MG/1
10 TABLET ORAL DAILY
Qty: 90 TABLET | Refills: 1 | Status: SHIPPED | OUTPATIENT
Start: 2024-03-13

## 2024-03-13 RX ORDER — FINASTERIDE 1 MG/1
1 TABLET, FILM COATED ORAL DAILY
Qty: 90 TABLET | Refills: 3 | Status: SHIPPED | OUTPATIENT
Start: 2024-03-13

## 2024-03-13 NOTE — PROGRESS NOTES
"  Assessment & Plan   Problem List Items Addressed This Visit    None  Visit Diagnoses       Androgenetic alopecia        Relevant Medications    finasteride (PROPECIA) 1 MG tablet    minoxidil (LONITEN) 10 MG tablet           At previous visit, question of tinea capitis.  Unclear if that is actually going on.  Patient would like to avoid any oral antifungals at this time.  Medications as listed above.  Will let us know if there is any new issues or problems going on.                   Luiz Osei is a 23 year old, presenting for the following health issues:  Recheck Medication      3/13/2024     3:21 PM   Additional Questions   Roomed by nan ulloa cma     History of Present Illness       Reason for visit:  Refill medocation    He eats 2-3 servings of fruits and vegetables daily.He consumes 0 sweetened beverage(s) daily.He exercises with enough effort to increase his heart rate 60 or more minutes per day.  He exercises with enough effort to increase his heart rate 5 days per week. He is missing 7 dose(s) of medications per week.                 Review of Systems  Constitutional, HEENT, cardiovascular, pulmonary, gi and gu systems are negative, except as otherwise noted.      Objective    /77 (BP Location: Right arm, Patient Position: Sitting, Cuff Size: Adult Regular)   Pulse 71   Ht 1.753 m (5' 9\")   Wt 59.1 kg (130 lb 4.8 oz)   SpO2 100%   BMI 19.24 kg/m    Body mass index is 19.24 kg/m .  Physical Exam   GENERAL: alert and no distress  Exam of the scalp reveals slightly thinning hair on the top of his scalp.  No specific spots of hair loss.            Signed Electronically by: LILIYA CHIRINOS MD    "

## 2024-03-14 ENCOUNTER — TELEPHONE (OUTPATIENT)
Dept: FAMILY MEDICINE | Facility: CLINIC | Age: 24
End: 2024-03-14

## 2024-03-14 NOTE — TELEPHONE ENCOUNTER
General Call    Contacts         Type Contact Phone/Fax    03/14/2024 07:38 AM CDT Phone (Incoming) Sea Ash M (Self) [phone] (M)          Reason for Call:  dose decrease    What are your questions or concerns:  pt calling asking that does on minoxidil is changes to 5mg  States he has not picked it up but after thinking about it, he would prefer to start on 5mg    Date of last appointment with provider: yesterday    Could we send this information to you in [employer] or would you prefer to receive a phone call?:   Patient would like to be contacted via [employer]

## 2024-05-17 ENCOUNTER — APPOINTMENT (OUTPATIENT)
Dept: GENERAL RADIOLOGY | Facility: CLINIC | Age: 24
End: 2024-05-17
Attending: EMERGENCY MEDICINE

## 2024-05-17 ENCOUNTER — HOSPITAL ENCOUNTER (EMERGENCY)
Facility: CLINIC | Age: 24
Discharge: HOME | End: 2024-05-17
Attending: EMERGENCY MEDICINE | Admitting: EMERGENCY MEDICINE

## 2024-05-17 VITALS
DIASTOLIC BLOOD PRESSURE: 70 MMHG | SYSTOLIC BLOOD PRESSURE: 130 MMHG | HEART RATE: 80 BPM | RESPIRATION RATE: 16 BRPM | TEMPERATURE: 98.6 F | OXYGEN SATURATION: 98 %

## 2024-05-17 DIAGNOSIS — S49.92XA SHOULDER INJURY, LEFT, INITIAL ENCOUNTER: ICD-10-CM

## 2024-05-17 PROCEDURE — 99283 EMERGENCY DEPT VISIT LOW MDM: CPT

## 2024-05-17 PROCEDURE — 73030 X-RAY EXAM OF SHOULDER: CPT | Mod: LT

## 2024-05-17 ASSESSMENT — ACTIVITIES OF DAILY LIVING (ADL): ADLS_ACUITY_SCORE: 33

## 2024-05-17 ASSESSMENT — COLUMBIA-SUICIDE SEVERITY RATING SCALE - C-SSRS
6. HAVE YOU EVER DONE ANYTHING, STARTED TO DO ANYTHING, OR PREPARED TO DO ANYTHING TO END YOUR LIFE?: NO
1. IN THE PAST MONTH, HAVE YOU WISHED YOU WERE DEAD OR WISHED YOU COULD GO TO SLEEP AND NOT WAKE UP?: NO
2. HAVE YOU ACTUALLY HAD ANY THOUGHTS OF KILLING YOURSELF IN THE PAST MONTH?: NO

## 2024-05-17 NOTE — ED PROVIDER NOTES
Emergency Department Note      History of Present Illness     Chief Complaint  Shoulder Pain    HPI  eSa Ash is a 23 year old male who presents today for evaluation of left shoulder pain.  Patient noticed it started hurting when he was lifting weights and doing upper arm strength workout.  He stopped doing the weights and the pain went away.  He started lifting again and then it started to come back recently.  He did not take any medications such as ibuprofen or Tylenol.  He noticed that is worse with external and internal rotation as well as reaching.  He did not hear any clicking with range of motion.  He denies any actual direct trauma.  He is right-hand dominant.  No history of left shoulder surgery or injury.    Independent Historian  None    Review of External Notes  None  Past Medical History   Medical History and Problem List  No past medical history on file.    Medications  finasteride (PROPECIA) 1 MG tablet  minoxidil (LONITEN) 10 MG tablet        Surgical History   Past Surgical History:   Procedure Laterality Date    NO HISTORY OF SURGERY       Physical Exam   Patient Vitals for the past 24 hrs:   BP Temp Temp src Pulse Resp SpO2   05/17/24 0011 130/70 98.6  F (37  C) Oral 80 16 98 %     Physical Exam  General- alert, cooperative  Pulm- normal respiratory effort, no respiratory distress  Msk- RUE: 2+ pulses, sensation to light touch intact, no wounds or abrasions   ROM normal without difficulty, 5/5 strength           LUE: 2+ pulses, sensation to light touch intact, no wounds or abrasions   ROM normal without difficulty, 5/5 strength. Mild TTP in the shoulder joint with rotation and overhead reach  Skin- no cyanosis or edema, no swelling, no rash or petechiae  Psych- normal mood and affect, normal behavior               Diagnostics       Imaging  XR Shoulder Left G/E 3 Views   Final Result   IMPRESSION: On one single view only, there is focal cortical deformity with minimal cortical discontinuity  in the medial aspect of the humeral neck, equivocal for fracture. No dislocation identified. Joint spaces are preserved and intact.              Independent Interpretation  None  ED Course    Medications Administered  Medications - No data to display    Procedures  Procedures     Discussion of Management  None    Social Determinants of Health adding to complexity of care  None    ED Course   0103 Exam  Medical Decision Making / Diagnosis   CMS Diagnoses: None    MIPS     None    MDM  Sea Ash is a 23 year old male who presents with shoulder injury likely related to overuse.  He is describing pain worsening with activity as well as lifting.  X-ray showed a cortical deformity, although the patient indicates that he has not had a prior shoulder injury including dislocation.  I wonder if this is a chronic injury rather than acute since his symptoms have been ongoing for 3 weeks. He is referred to [facility] for follow-up and likely needing physical therapy as well as have asked him to stop lifting weights and do any movements that worsen his pain.  He is advised to start on anti-inflammatory such as ibuprofen 600 mg 3-4 times a day.  He can also use ice and Tylenol.  Return precaution provided.    Disposition  The patient was discharged.     ICD-10 Codes:    ICD-10-CM    1. Shoulder injury, left, initial encounter  S49.92XA                MD Michael Tsang Wenlan, MD  05/17/24 0151

## 2024-05-17 NOTE — ED TRIAGE NOTES
Pt. Presents to ED with complaints of L sided shoulder for about the last month. Reports he thinks he hurt it at the gym so he took 3 weeks off to try and heal it but attempted some stretching tonight and had worsened pain. AVSS on RA. A & O x 4, independent. Declining tylenol and ibuprofen in triage. Here for a XR.       Triage Assessment (Adult)       Row Name 05/17/24 0012          Triage Assessment    Airway WDL WDL        Respiratory WDL    Respiratory WDL WDL        Skin Circulation/Temperature WDL    Skin Circulation/Temperature WDL WDL        Cardiac WDL    Cardiac WDL WDL        Peripheral/Neurovascular WDL    Peripheral Neurovascular WDL WDL        Cognitive/Neuro/Behavioral WDL    Cognitive/Neuro/Behavioral WDL WDL

## 2024-05-17 NOTE — DISCHARGE INSTRUCTIONS
Stop lifting weights. Start icing and using ibuprofen 600mg every 6 hours  Please follow up with [facility] to further evaluate your shoulder

## 2024-07-15 ENCOUNTER — OFFICE VISIT (OUTPATIENT)
Dept: INTERNAL MEDICINE | Facility: CLINIC | Age: 24
End: 2024-07-15
Payer: COMMERCIAL

## 2024-07-15 ENCOUNTER — THERAPY VISIT (OUTPATIENT)
Dept: PHYSICAL THERAPY | Facility: CLINIC | Age: 24
End: 2024-07-15
Attending: STUDENT IN AN ORGANIZED HEALTH CARE EDUCATION/TRAINING PROGRAM
Payer: COMMERCIAL

## 2024-07-15 VITALS
OXYGEN SATURATION: 100 % | TEMPERATURE: 97.3 F | HEART RATE: 90 BPM | HEIGHT: 69 IN | WEIGHT: 132.7 LBS | RESPIRATION RATE: 12 BRPM | SYSTOLIC BLOOD PRESSURE: 115 MMHG | DIASTOLIC BLOOD PRESSURE: 73 MMHG | BODY MASS INDEX: 19.65 KG/M2

## 2024-07-15 DIAGNOSIS — M25.512 ACUTE PAIN OF LEFT SHOULDER: ICD-10-CM

## 2024-07-15 DIAGNOSIS — M25.512 ACUTE PAIN OF LEFT SHOULDER: Primary | ICD-10-CM

## 2024-07-15 PROCEDURE — 97161 PT EVAL LOW COMPLEX 20 MIN: CPT | Mod: GP | Performed by: PHYSICAL THERAPIST

## 2024-07-15 PROCEDURE — 99214 OFFICE O/P EST MOD 30 MIN: CPT | Performed by: STUDENT IN AN ORGANIZED HEALTH CARE EDUCATION/TRAINING PROGRAM

## 2024-07-15 PROCEDURE — 97110 THERAPEUTIC EXERCISES: CPT | Mod: GP | Performed by: PHYSICAL THERAPIST

## 2024-07-15 ASSESSMENT — ACTIVITIES OF DAILY LIVING (ADL)
PLEASE_INDICATE_YOR_PRIMARY_REASON_FOR_REFERRAL_TO_THERAPY:: SHOULDER
AT_ITS_WORST?: 6

## 2024-07-15 ASSESSMENT — PAIN SCALES - GENERAL: PAINLEVEL: SEVERE PAIN (6)

## 2024-07-15 NOTE — PROGRESS NOTES
"  Assessment & Plan     (M25.512) Acute pain of left shoulder  (primary encounter diagnosis)  - Left posterior and lateral shoulder pain mostly with specific movements  - Has not tried any home remedies  - XR showed no fracture May 2024  Plan: MR Shoulder Left w/o Contrast, Physical Therapy         Referral        Iburpofen prn for pain        Subjective   Sea is a 23 year old, presenting for the following health issues:  Shoulder Pain (Since end of April.  Needs referral to PT, see xray from ED visit on 5/17/24)        7/15/2024     7:43 AM   Additional Questions   Roomed by Kaylee MARTINEZ   Accompanied by n/a     History of Present Illness       Reason for visit:  Shoulder  Symptom onset:  More than a month  Symptom intensity:  Moderate  Symptom progression:  Staying the same  Had these symptoms before:  No    He eats 0-1 servings of fruits and vegetables daily.He consumes 0 sweetened beverage(s) daily.He exercises with enough effort to increase his heart rate 30 to 60 minutes per day.  He exercises with enough effort to increase his heart rate 4 days per week.   He is taking medications regularly.             Review of Systems  Constitutional, neuro, ENT, endocrine, pulmonary, cardiac, gastrointestinal, genitourinary, musculoskeletal, integument and psychiatric systems are negative, except as otherwise noted.      Objective    /73 (BP Location: Left arm, Cuff Size: Adult Regular)   Pulse 90   Temp 97.3  F (36.3  C) (Tympanic)   Resp 12   Ht 1.759 m (5' 9.25\")   Wt 60.2 kg (132 lb 11.2 oz)   SpO2 100%   BMI 19.46 kg/m    Body mass index is 19.46 kg/m .  Physical Exam  Constitutional:       Appearance: Normal appearance.   HENT:      Head: Atraumatic.   Eyes:      Extraocular Movements: Extraocular movements intact.   Cardiovascular:      Rate and Rhythm: Normal rate and regular rhythm.   Pulmonary:      Breath sounds: Normal breath sounds.   Abdominal:      Palpations: Abdomen is soft. "   Musculoskeletal:      Cervical back: Normal range of motion.      Comments: Left shoulder ROM mildly reduced   Neurological:      General: No focal deficit present.      Mental Status: He is alert.   Psychiatric:         Mood and Affect: Mood normal.         Thought Content: Thought content normal.          I personally spent 31 minutes on chart review, patient care, care coordination, and treatment for this patient.     Signed Electronically by: Chito Michele MD

## 2024-07-15 NOTE — PROGRESS NOTES
PHYSICAL THERAPY EVALUATION  Type of Visit: Evaluation              Subjective       Presenting condition or subjective complaint:  In Mid may he went to the gym and he had increased pain.  He cannot move the shoulder overhead without pain. Cannot do a push up. He tried resistance bands for 1 day but have increased pain. Has not tried: ice, heat, or stretching.  He can't sleep on the Right side due to increased pain in the left shoulder.  He sleeps for 3 hours then wakes due to pain. He likes to play basketball, swimming, and weight lifting. He does some machines and dumbbell work. The pain is like a ring around the shoulder. Patient went to the MD this morning who reviewed the x-ray indicating no fracture and had him schedule physical therapy and an MRI.     Date of onset: 05/15/24    Relevant medical history:     Dates & types of surgery:      Prior diagnostic imaging/testing results: X-ray     Prior therapy history for the same diagnosis, illness or injury:        Prior Level of Function  Transfers: Independent  Ambulation: Independent  ADL: Independent    Living Environment  Social support: With family members   Type of home: Apartment/condo   Stairs to enter the home:         Ramp: No   Stairs inside the home: No       Help at home: None  Equipment owned:       Employment:      Hobbies/Interests:      Patient goals for therapy:  return to exercise and reach overhead without pain.     Pain assessment: Location: posterior shoulder with aggravation/Ratin-10/10.   Lasts 15-20 second and then it is gone.      Objective   Shoulder Objective  Observation:  Shoulder's are equal at rest. No shoulder hike present with elevation of arm. Patient demonstrates significantly rounded shoulders and forward head posture. Scapulothoracic dyskinesis is present. Scapular winging present with any IR or Shoulder extension.   ROM:   Shoulder AROM   Motion Right  Left   Flexion 190 185 end range pain   Abduction 180 180 end range  pain   External Rotation 95 90   Functional Internal Rotation T5 T5     All blank boxes in this table indicate negative special testing.    Special Tests Standing   Test Right Left   Speeds (Labral) NT    Neers (Impingement)  +   Horn Blowers (RC Infraspinatus)     Anu Delong (Impingement) NT    Bear Hug (RC subscap) NT    Humphreys Active Compression (Labral)     Full Can (RC)     Empty Can (RC)     Drop Arm (RC)     Lift Off (RC subscap)        MMT:  MMT Testing in Sitting   Motion Right Left   Flexion  5/5   Abduction  5/5   IR-0  5/5   ER-0  5/5     Palpation: Painful and tight to palpation Left infraspinatus, supraspinatus, and teres minor.   Shoulder PROM   Motion Right  Left   Flexion 190 185 end range pain        ER-90 100 105 end range pain.    IR-90 80 90     All blank boxes in this table indicate normal pain free joint mobility.   Shoulder Joint Mobility   Joint/Direction Right Left    GHJ Posterior  Hypermobile   GHJ Inferior  Hypermobile   GHJ Anterior  Hypermobile   AC Joint     SC Joint     Scapulothoracic Joint       Strength:   MMT Testing in Prone   Muscle Right Left   Latissimus Dorsi 4-/5 scapular winging present 4-/5 scapular winging present   Rhomboids (thumbs down) 4-/5 scapular winging present 4-/5 scapular winging present   Middle Trapezius (Thumbs up) 3/5 scapular winging present 3/5 scapular winging present     Assessment & Plan   CLINICAL IMPRESSIONS  Medical Diagnosis: Acute pain of left shoulder (M25.512)    Treatment Diagnosis: Acute pain of left shoulder (M25.512)   Impression/Assessment: Patient is a 23 year old male with Left shoulder  complaints.  The following significant findings have been identified: Pain, Decreased ROM/flexibility, Decreased strength, Decreased proprioception, Inflammation, Impaired muscle performance, Decreased activity tolerance, and Impaired posture. These impairments interfere with their ability to perform self care tasks, recreational activities, and  household chores as compared to previous level of function.     Clinical Decision Making (Complexity):  Clinical Presentation: Stable/Uncomplicated  Clinical Presentation Rationale: based on medical and personal factors listed in PT evaluation  Clinical Decision Making (Complexity): Low complexity    PLAN OF CARE  Treatment Interventions:  Modalities: Contrast Bath, Cryotherapy, Dry Needling, E-stim, Hot Pack, Laser Light, Mechanical Traction, Ultrasound, tape  Interventions: Manual Therapy, Neuromuscular Re-education, Therapeutic Activity, Therapeutic Exercise, Self-Care/Home Management    Long Term Goals     PT Goal 1  Goal Identifier: unreastricted reaching  Goal Description: Patient will report ability to reach in all directions without pain.  Rationale: to maximize safety and independence with performance of ADLs and functional tasks;to maximize safety and independence within the home;to maximize safety and independence within the community;to maximize safety and independence with transportation;to maximize safety and independence with self cares  Target Date: 09/09/24      Frequency of Treatment: 1 time per week  Duration of Treatment: 8 weeks    Education Assessment:   Learner/Method: Patient;Listening;Demonstration;Reading;Pictures/Video;No Barriers to Learning  Education Comments: Educated on findings of exam and likely frequency/duration of PT POC.    Risks and benefits of evaluation/treatment have been explained.   Patient/Family/caregiver agrees with Plan of Care.     Evaluation Time:     PT Eval, Low Complexity Minutes (32191): 15     Signing Clinician: MONICA Breen [facility]                                                                                   OUTPATIENT PHYSICAL THERAPY      PLAN OF TREATMENT FOR OUTPATIENT REHABILITATION   Patient's Last Name, First Name, Sea Majano YOB: 2000   Provider's Name   [facility]   Medical Record No.  [id number]     Onset Date: 05/15/24  Start of Care Date: 07/15/24     Medical Diagnosis:  Acute pain of left shoulder (M25.512)      PT Treatment Diagnosis:  Acute pain of left shoulder (M25.512) Plan of Treatment  Frequency/Duration: 1 time per week/ 8 weeks    Certification date from 07/15/24 to 09/09/24         See note for plan of treatment details and functional goals     Sue Kate, PT                         I CERTIFY THE NEED FOR THESE SERVICES FURNISHED UNDER        THIS PLAN OF TREATMENT AND WHILE UNDER MY CARE     (Physician attestation of this document indicates review and certification of the therapy plan).              Referring Provider:  Chito Michele MD    Initial Assessment  See Epic Evaluation- Start of Care Date: 07/15/24

## 2024-08-14 ENCOUNTER — THERAPY VISIT (OUTPATIENT)
Dept: PHYSICAL THERAPY | Facility: CLINIC | Age: 24
End: 2024-08-14
Payer: COMMERCIAL

## 2024-08-14 DIAGNOSIS — M25.512 ACUTE PAIN OF LEFT SHOULDER: Primary | ICD-10-CM

## 2024-08-14 PROCEDURE — 97110 THERAPEUTIC EXERCISES: CPT | Mod: GP | Performed by: PHYSICAL THERAPIST

## 2024-08-14 PROCEDURE — 97112 NEUROMUSCULAR REEDUCATION: CPT | Mod: GP | Performed by: PHYSICAL THERAPIST

## 2024-08-14 PROCEDURE — 97140 MANUAL THERAPY 1/> REGIONS: CPT | Mod: GP | Performed by: PHYSICAL THERAPIST

## 2024-08-21 ENCOUNTER — HOSPITAL ENCOUNTER (OUTPATIENT)
Dept: MRI IMAGING | Facility: CLINIC | Age: 24
Discharge: HOME | End: 2024-08-21
Attending: STUDENT IN AN ORGANIZED HEALTH CARE EDUCATION/TRAINING PROGRAM | Admitting: STUDENT IN AN ORGANIZED HEALTH CARE EDUCATION/TRAINING PROGRAM
Payer: COMMERCIAL

## 2024-08-21 ENCOUNTER — THERAPY VISIT (OUTPATIENT)
Dept: PHYSICAL THERAPY | Facility: CLINIC | Age: 24
End: 2024-08-21
Payer: COMMERCIAL

## 2024-08-21 DIAGNOSIS — M25.512 ACUTE PAIN OF LEFT SHOULDER: ICD-10-CM

## 2024-08-21 DIAGNOSIS — M25.512 ACUTE PAIN OF LEFT SHOULDER: Primary | ICD-10-CM

## 2024-08-21 PROCEDURE — 73221 MRI JOINT UPR EXTREM W/O DYE: CPT | Mod: 26 | Performed by: RADIOLOGY

## 2024-08-21 PROCEDURE — 97112 NEUROMUSCULAR REEDUCATION: CPT | Mod: GP | Performed by: PHYSICAL THERAPIST

## 2024-08-21 PROCEDURE — 73221 MRI JOINT UPR EXTREM W/O DYE: CPT | Mod: LT

## 2024-08-21 PROCEDURE — 97110 THERAPEUTIC EXERCISES: CPT | Mod: GP | Performed by: PHYSICAL THERAPIST

## 2024-08-28 ENCOUNTER — THERAPY VISIT (OUTPATIENT)
Dept: PHYSICAL THERAPY | Facility: CLINIC | Age: 24
End: 2024-08-28
Payer: COMMERCIAL

## 2024-08-28 DIAGNOSIS — M25.512 ACUTE PAIN OF LEFT SHOULDER: Primary | ICD-10-CM

## 2024-08-28 PROCEDURE — 97110 THERAPEUTIC EXERCISES: CPT | Mod: GP | Performed by: PHYSICAL THERAPIST

## 2024-08-28 PROCEDURE — 97112 NEUROMUSCULAR REEDUCATION: CPT | Mod: GP | Performed by: PHYSICAL THERAPIST

## 2024-10-09 DIAGNOSIS — L64.9 ANDROGENETIC ALOPECIA: ICD-10-CM

## 2024-10-10 RX ORDER — MINOXIDIL 10 MG/1
10 TABLET ORAL DAILY
Qty: 90 TABLET | Refills: 1 | Status: SHIPPED | OUTPATIENT
Start: 2024-10-10

## 2024-10-15 ENCOUNTER — DOCUMENTATION ONLY (OUTPATIENT)
Dept: DERMATOLOGY | Facility: CLINIC | Age: 24
End: 2024-10-15

## 2024-10-15 ENCOUNTER — OFFICE VISIT (OUTPATIENT)
Dept: DERMATOLOGY | Facility: CLINIC | Age: 24
End: 2024-10-15
Attending: FAMILY MEDICINE
Payer: COMMERCIAL

## 2024-10-15 DIAGNOSIS — L64.9 ANDROGENETIC ALOPECIA: Primary | ICD-10-CM

## 2024-10-15 DIAGNOSIS — L65.9 ALOPECIA: ICD-10-CM

## 2024-10-15 PROCEDURE — 99204 OFFICE O/P NEW MOD 45 MIN: CPT | Performed by: PHYSICIAN ASSISTANT

## 2024-10-15 RX ORDER — TRETINOIN 0.1 MG/G
GEL TOPICAL
Qty: 45 G | Refills: 3 | Status: SHIPPED | OUTPATIENT
Start: 2024-10-15

## 2024-10-15 RX ORDER — KETOCONAZOLE 20 MG/ML
SHAMPOO, SUSPENSION TOPICAL
Qty: 120 ML | Refills: 11 | Status: SHIPPED | OUTPATIENT
Start: 2024-10-15

## 2024-10-15 RX ORDER — DUTASTERIDE 0.5 MG/1
CAPSULE, LIQUID FILLED ORAL
Qty: 90 CAPSULE | Refills: 3 | Status: SHIPPED | OUTPATIENT
Start: 2024-10-15

## 2024-10-15 NOTE — LETTER
October 25, 2024      Sea Ash  [address]   [place] [address]        Dear ,    We are writing to inform you of your test results.    Testosterone is a little elevated, but it's not so high I'm worried about a major issue. Otherwise labs are normal; no change in treatment plan     Resulted Orders   Anti Nuclear Monica IgG by IFA with Reflex   Result Value Ref Range    OZIEL interpretation Negative Negative      Comment:        Negative:              <1:40  Borderline Positive:   1:40 - 1:80  Positive:              >1:80   DHEA sulfate   Result Value Ref Range    DHEA Sulfate 370 80 - 560 ug/dL   Ferritin   Result Value Ref Range    Ferritin 35 31 - 409 ng/mL   Iron and iron binding capacity   Result Value Ref Range    Iron 111 61 - 157 ug/dL    Iron Binding Capacity 351 240 - 430 ug/dL    Iron Sat Index 32 15 - 46 %   TSH with free T4 reflex   Result Value Ref Range    TSH 2.86 0.30 - 4.20 uIU/mL   Thyroid peroxidase antibody   Result Value Ref Range    Thyroid Peroxidase Antibody 28 <35 IU/mL   Protein total   Result Value Ref Range    Protein Total 7.5 6.4 - 8.3 g/dL   Vitamin D Deficiency   Result Value Ref Range    Vitamin D, Total (25-Hydroxy) 82 (H) 20 - 50 ng/mL      Comment:      toxicity possible    Narrative    Season, race, dietary intake, and treatment affect the concentration of 25-hydroxy-Vitamin D. Values may decrease during winter months and increase during summer months.    Vitamin D determination is routinely performed by an immunoassay specific for 25 hydroxyvitamin D3.  If an individual is on vitamin D2(ergocalciferol) supplementation, please specify 25 OH vitamin D2 and D3 level determination by LCMSMS test VITD23.     Sex Hormone Binding Globulin   Result Value Ref Range    Sex Hormone Binding Globulin 65 11 - 80 nmol/L   Testosterone Free and Total   Result Value Ref Range    Free Testosterone Calculated 14.95 ng/dL      Comment:      Male Duncan Ranges:  Duncan Stage I: Less  than or equal to 0.37 ng/dL  Duncan Stage II: 0.03-2.1 ng/dL  Duncan Stage III: 0.10-9.8 ng/dL  Duncan Stage IV: 3.5-16.9 ng/dL  Duncan Stage V: 4.1-23.9 ng/dL    Testosterone Total 990 (H) 240 - 950 ng/dL    Narrative    This test was developed and its performance characteristics determined by the [facility],  Special Chemistry Laboratory. It has not been cleared or approved by the FDA. The laboratory is regulated under CLIA as qualified to perform high-complexity testing. This test is used for clinical purposes. It should not be regarded as investigational or for research.       If you have any questions or concerns, please call the clinic at the number listed above.       Sincerely,      Mickie Martins PA-C

## 2024-10-15 NOTE — LETTER
10/15/2024      Sea Ash  [address]  [place] [address]      Dear Colleague,    Thank you for referring your patient, Sea Ash, to the [facility]. Please see a copy of my visit note below.    HPI:   Chief complaints: Sea Ash is a pleasant 24 year old male who presents for evaluation of hair loss. He has had thinning for the past 4-carolyn years. The loss has been gradual. He did not have any shedding episodes. No pain on the scalp but he does have occasional irritation. He started finasteride about 2 months ago and minoxidil about 6 months ago. He feels that since starting finasteride he has not gotten any worse, but has noticed much improvement. He does have a fhx of hair thinning in his father.       PHYSICAL EXAM:    There were no vitals taken for this visit.  Skin exam performed as follows: Type 4 skin. Mood appropriate  Alert and Oriented X 3. Well developed, well nourished in no distress.  General appearance: Normal  Head including face: Normal  Eyes: conjunctiva and lids: Normal  Mouth: Lips, teeth, gums: Normal  Neck: Normal  Skin: Scalp and body hair: See below    Decreased density through vertex scalp. Scalp normal without erythema or scaling. Negative hair pull.     ASSESSMENT/PLAN:     Androgenic alopecia. Advised on natural course and progression secondary to hormones and genetics. Discussed topical minoxidil 5% foam or solution as well as finasteride.    --Continue minoxidil daily - advised to decrease to 5 mg daily from 10 mg daily  --Switch from finasteride 1 mg daily to dutasteride 0.5mg daily  --He would like to try topical tretinoin gel for the scalp - advised to use every other day (less if irritation develops)  --Photographs taken for reference  --Check labs today        Follow-up: 9-12 months  CC:   Scribed By: Mickie Martins, MS, PA-C      Again, thank you for allowing me to participate in the care of your patient.         Sincerely,        Mickie Urban PA-C

## 2024-10-15 NOTE — PROGRESS NOTES
Sea Ash has an upcoming lab appointment:    Future Appointments   Date Time Provider Department [place]   [facility]    7/22/2025  9[facility]30 [facility]     Patient is scheduled for the following lab(s):   Scheduling and Arrival Information   Scheduling Notes:     Made On: 10/15/2024 1:29 PM By: TATIANNA MURO       There is no order available. Please review and place either future orders or HMPO (Review of Health Maintenance Protocol Orders), as appropriate.    Health Maintenance Due   Topic    ANNUAL REVIEW OF HM ORDERS      If no labs are needed at this time please have the Care Team contact patient regarding this information and cancel lab appointment. Thank you.     No MART

## 2024-10-15 NOTE — PROGRESS NOTES
HPI:   Chief complaints: eSa Ash is a pleasant 24 year old male who presents for evaluation of hair loss. He has had thinning for the past 4-carolyn years. The loss has been gradual. He did not have any shedding episodes. No pain on the scalp but he does have occasional irritation. He started finasteride about 2 months ago and minoxidil about 6 months ago. He feels that since starting finasteride he has not gotten any worse, but has noticed much improvement. He does have a fhx of hair thinning in his father.       PHYSICAL EXAM:    There were no vitals taken for this visit.  Skin exam performed as follows: Type 4 skin. Mood appropriate  Alert and Oriented X 3. Well developed, well nourished in no distress.  General appearance: Normal  Head including face: Normal  Eyes: conjunctiva and lids: Normal  Mouth: Lips, teeth, gums: Normal  Neck: Normal  Skin: Scalp and body hair: See below    Decreased density through vertex scalp. Scalp normal without erythema or scaling. Negative hair pull.     ASSESSMENT/PLAN:     Androgenic alopecia. Advised on natural course and progression secondary to hormones and genetics. Discussed topical minoxidil 5% foam or solution as well as finasteride.    --Continue minoxidil daily - advised to decrease to 5 mg daily from 10 mg daily  --Switch from finasteride 1 mg daily to dutasteride 0.5mg daily  --He would like to try topical tretinoin gel for the scalp - advised to use every other day (less if irritation develops)  --Photographs taken for reference  --Check labs today        Follow-up: 9-12 months  CC:   Scribed By: Mickie Martins, MS, PARamonC

## 2024-10-21 LAB
FERRITIN SERPL-MCNC: 35 NG/ML (ref 31–409)
IRON BINDING CAPACITY (ROCHE): 351 UG/DL (ref 240–430)
IRON SATN MFR SERPL: 32 % (ref 15–46)
IRON SERPL-MCNC: 111 UG/DL (ref 61–157)
PROT SERPL-MCNC: 7.5 G/DL (ref 6.4–8.3)
SHBG SERPL-SCNC: 65 NMOL/L (ref 11–80)
TSH SERPL DL<=0.005 MIU/L-ACNC: 2.86 UIU/ML (ref 0.3–4.2)
VIT D+METAB SERPL-MCNC: 82 NG/ML (ref 20–50)

## 2024-10-21 PROCEDURE — 82728 ASSAY OF FERRITIN: CPT | Performed by: PHYSICIAN ASSISTANT

## 2024-10-21 PROCEDURE — 82627 DEHYDROEPIANDROSTERONE: CPT | Performed by: PHYSICIAN ASSISTANT

## 2024-10-21 PROCEDURE — 84270 ASSAY OF SEX HORMONE GLOBUL: CPT | Performed by: PHYSICIAN ASSISTANT

## 2024-10-21 PROCEDURE — 86038 ANTINUCLEAR ANTIBODIES: CPT | Performed by: PHYSICIAN ASSISTANT

## 2024-10-21 PROCEDURE — 83540 ASSAY OF IRON: CPT | Performed by: PHYSICIAN ASSISTANT

## 2024-10-21 PROCEDURE — 86376 MICROSOMAL ANTIBODY EACH: CPT | Performed by: PHYSICIAN ASSISTANT

## 2024-10-21 PROCEDURE — 82306 VITAMIN D 25 HYDROXY: CPT | Performed by: PHYSICIAN ASSISTANT

## 2024-10-21 PROCEDURE — 83550 IRON BINDING TEST: CPT | Performed by: PHYSICIAN ASSISTANT

## 2024-10-21 PROCEDURE — 36415 COLL VENOUS BLD VENIPUNCTURE: CPT | Performed by: PHYSICIAN ASSISTANT

## 2024-10-21 PROCEDURE — 84443 ASSAY THYROID STIM HORMONE: CPT | Performed by: PHYSICIAN ASSISTANT

## 2024-10-21 PROCEDURE — 84155 ASSAY OF PROTEIN SERUM: CPT | Performed by: PHYSICIAN ASSISTANT

## 2024-10-21 PROCEDURE — 84403 ASSAY OF TOTAL TESTOSTERONE: CPT | Performed by: PHYSICIAN ASSISTANT

## 2024-10-22 LAB
ANA SER QL IF: NEGATIVE
DHEA-S SERPL-MCNC: 370 UG/DL (ref 80–560)
THYROPEROXIDASE AB SERPL-ACNC: 28 IU/ML

## 2024-10-23 LAB
TESTOST FREE SERPL-MCNC: 14.95 NG/DL
TESTOST SERPL-MCNC: 990 NG/DL (ref 240–950)

## 2024-12-12 PROBLEM — M25.512 ACUTE PAIN OF LEFT SHOULDER: Status: RESOLVED | Noted: 2024-07-15 | Resolved: 2024-12-12

## 2024-12-23 ENCOUNTER — OFFICE VISIT (OUTPATIENT)
Dept: OPTOMETRY | Facility: CLINIC | Age: 24
End: 2024-12-23
Payer: COMMERCIAL

## 2024-12-23 DIAGNOSIS — H52.13 MYOPIA OF BOTH EYES WITH ASTIGMATISM: Primary | ICD-10-CM

## 2024-12-23 DIAGNOSIS — H52.203 MYOPIA OF BOTH EYES WITH ASTIGMATISM: Primary | ICD-10-CM

## 2024-12-23 ASSESSMENT — REFRACTION_MANIFEST
METHOD_AUTOREFRACTION: 1
OS_AXIS: 073
OD_CYLINDER: +3.00
OS_CYLINDER: +1.75
OD_AXIS: 084
OD_SPHERE: -4.00
OS_CYLINDER: +2.25
OD_AXIS: 100
OD_CYLINDER: +3.00
OS_SPHERE: -3.75
OS_AXIS: 065
OS_SPHERE: -3.75
OD_SPHERE: -4.00

## 2024-12-23 ASSESSMENT — VISUAL ACUITY
OS_SC: 20/20
OD_SC: 20/60
OD_PH_SC: 20/30
OS_SC: 20/200
METHOD: SNELLEN - LINEAR
OS_PH_SC: 20/40
OD_SC: 20/30

## 2024-12-23 ASSESSMENT — REFRACTION_CURRENTRX
OD_SPHERE: -1.00
OD_BRAND: ALCON AIR OPTIX FOR ASTIGMATISM BC 8.7, D 14.5
OD_AXIS: 010
OS_BRAND: ALCON AIR OPTIX FOR ASTIGMATISM BC 8.7, D 14.5
OS_CYLINDER: -1.75
OS_AXIS: 160
OS_SPHERE: -2.00
OD_CYLINDER: -2.75

## 2024-12-23 ASSESSMENT — CONF VISUAL FIELD
OS_SUPERIOR_TEMPORAL_RESTRICTION: 0
OS_NORMAL: 1
OD_SUPERIOR_NASAL_RESTRICTION: 0
METHOD: COUNTING FINGERS
OS_INFERIOR_TEMPORAL_RESTRICTION: 0
OD_INFERIOR_NASAL_RESTRICTION: 0
OD_SUPERIOR_TEMPORAL_RESTRICTION: 0
OD_INFERIOR_TEMPORAL_RESTRICTION: 0
OS_SUPERIOR_NASAL_RESTRICTION: 0
OD_NORMAL: 1
OS_INFERIOR_NASAL_RESTRICTION: 0

## 2024-12-23 ASSESSMENT — EXTERNAL EXAM - LEFT EYE: OS_EXAM: NORMAL

## 2024-12-23 ASSESSMENT — TONOMETRY
OS_IOP_MMHG: 15
IOP_METHOD: APPLANATION
OD_IOP_MMHG: 15

## 2024-12-23 ASSESSMENT — CUP TO DISC RATIO
OD_RATIO: 0.2
OS_RATIO: 0.2

## 2024-12-23 ASSESSMENT — SLIT LAMP EXAM - LIDS
COMMENTS: NORMAL
COMMENTS: NORMAL

## 2024-12-23 ASSESSMENT — EXTERNAL EXAM - RIGHT EYE: OD_EXAM: NORMAL

## 2024-12-23 NOTE — PROGRESS NOTES
Chief Complaint   Patient presents with    Annual Eye Exam        Last Eye Exam: 07/2022  Dilated Previously: Yes, side effects of dilation explained today    What are you currently using to see?  glasses and contacts - would like a contact lens eval today - first time wearer - not really wearing glasses they fall off  Ubered here     Distance Vision Acuity: Noticed gradual change in both eyes    Near Vision Acuity: Satisfied with vision while reading and using computer unaided    Eye Comfort: good  Do you use eye drops? : No      Marianne Haider - Optometric Assistant           Medical, surgical and family histories reviewed and updated 12/23/2024.       OBJECTIVE: See Ophthalmology exam    ASSESSMENT:    ICD-10-CM    1. Myopia of both eyes with astigmatism  H52.13     H52.203           PLAN:   Drove uncorrected and older glasses are off so defer dilation  Order contact lens  [facility]   He was not prepared for this today 100$ fit fee  Update glasses today, also discussed lasik in the future if no change.    Landy Quinones OD

## 2024-12-23 NOTE — CONFIDENTIAL NOTE
"Sea came in with an attitude and was rude and disrespectful to both my tech (Marianne) and I.    He wanted a tint for his car and when denied he said what if I dont want to wear sunglasses and I said well that's up to you but I will not write a prescription for this due to liability as it is illegal for a reason. He said \"WOW\" well you're a doctor so you can write me a prescription , what if I have a disease that the sun bothers me. I reiterated sunglasses would be a better option.  He pressed but I explained my position of going around the law for him and the danger of tinted windshields with driving.  He was very roverto, but warmed up at the end of the exam, he was unprepared to pay for a contact lens fitting and we discussed this was not covered by insurance.  Trials were ordered and he will return to clinic for dispense / I&R, and pay at that time.  He was kind at the end and said I was just messin with you and thanked me.     Landy Quinones OD   "

## 2024-12-23 NOTE — LETTER
12/23/2024      Sea Ash  [address]  [place] [address]      Dear Colleague,    Thank you for referring your patient, Sea sAh, to the [facility] [place]. Please see a copy of my visit note below.    Chief Complaint   Patient presents with     Annual Eye Exam        Last Eye Exam: 07/2022  Dilated Previously: Yes, side effects of dilation explained today    What are you currently using to see?  glasses and contacts - would like a contact lens eval today - first time wearer - not really wearing glasses they fall off  Ubered here     Distance Vision Acuity: Noticed gradual change in both eyes    Near Vision Acuity: Satisfied with vision while reading and using computer unaided    Eye Comfort: good  Do you use eye drops? : No      Marianne Haider - Optometric Assistant           Medical, surgical and family histories reviewed and updated 12/23/2024.       OBJECTIVE: See Ophthalmology exam    ASSESSMENT:    ICD-10-CM    1. Myopia of both eyes with astigmatism  H52.13     H52.203           PLAN:   Drove uncorrected and older glasses are off so defer dilation  Order contact lens  trials he will pay fitting at [facility], [facility]   He was not prepared for this today 100$ fit fee  Update glasses today, also discussed lasik in the future if no change.    Lnady Quinones OD     Again, thank you for allowing me to participate in the care of your patient.        Sincerely,        Landy Quinones, OD    Electronically signed

## 2025-01-07 ENCOUNTER — APPOINTMENT (OUTPATIENT)
Dept: OPTOMETRY | Facility: CLINIC | Age: 25
End: 2025-01-07
Payer: COMMERCIAL

## 2025-01-07 ENCOUNTER — TELEPHONE (OUTPATIENT)
Dept: OPTOMETRY | Facility: CLINIC | Age: 25
End: 2025-01-07
Payer: COMMERCIAL

## 2025-01-07 PROCEDURE — 92340 FIT SPECTACLES MONOFOCAL: CPT | Performed by: OPTOMETRIST

## 2025-01-07 NOTE — TELEPHONE ENCOUNTER
BRISEYDAM that contact lens trials are in and to call back and set up a dispense appointment with Dr. Quinones

## 2025-03-01 ENCOUNTER — HEALTH MAINTENANCE LETTER (OUTPATIENT)
Age: 25
End: 2025-03-01